# Patient Record
Sex: MALE | Race: WHITE | NOT HISPANIC OR LATINO | URBAN - METROPOLITAN AREA
[De-identification: names, ages, dates, MRNs, and addresses within clinical notes are randomized per-mention and may not be internally consistent; named-entity substitution may affect disease eponyms.]

---

## 2017-01-02 ENCOUNTER — EMERGENCY (EMERGENCY)
Facility: HOSPITAL | Age: 82
LOS: 1 days | Discharge: HOME | End: 2017-01-02
Attending: EMERGENCY MEDICINE | Admitting: EMERGENCY MEDICINE
Payer: MEDICARE

## 2017-01-02 VITALS
SYSTOLIC BLOOD PRESSURE: 135 MMHG | DIASTOLIC BLOOD PRESSURE: 71 MMHG | HEART RATE: 71 BPM | RESPIRATION RATE: 17 BRPM | TEMPERATURE: 98 F | OXYGEN SATURATION: 98 %

## 2017-01-02 VITALS
DIASTOLIC BLOOD PRESSURE: 70 MMHG | SYSTOLIC BLOOD PRESSURE: 163 MMHG | HEART RATE: 60 BPM | OXYGEN SATURATION: 97 % | TEMPERATURE: 98 F | RESPIRATION RATE: 18 BRPM | WEIGHT: 163.58 LBS

## 2017-01-02 DIAGNOSIS — E11.9 TYPE 2 DIABETES MELLITUS WITHOUT COMPLICATIONS: ICD-10-CM

## 2017-01-02 DIAGNOSIS — Z95.1 PRESENCE OF AORTOCORONARY BYPASS GRAFT: Chronic | ICD-10-CM

## 2017-01-02 DIAGNOSIS — K62.5 HEMORRHAGE OF ANUS AND RECTUM: ICD-10-CM

## 2017-01-02 DIAGNOSIS — Z79.899 OTHER LONG TERM (CURRENT) DRUG THERAPY: ICD-10-CM

## 2017-01-02 DIAGNOSIS — I10 ESSENTIAL (PRIMARY) HYPERTENSION: ICD-10-CM

## 2017-01-02 DIAGNOSIS — K64.4 RESIDUAL HEMORRHOIDAL SKIN TAGS: ICD-10-CM

## 2017-01-02 DIAGNOSIS — I25.10 ATHEROSCLEROTIC HEART DISEASE OF NATIVE CORONARY ARTERY WITHOUT ANGINA PECTORIS: ICD-10-CM

## 2017-01-02 LAB
ALBUMIN SERPL ELPH-MCNC: 4.1 G/DL — SIGNIFICANT CHANGE UP (ref 3.4–5)
ALP SERPL-CCNC: 66 U/L — SIGNIFICANT CHANGE UP (ref 40–120)
ALT FLD-CCNC: 18 U/L — SIGNIFICANT CHANGE UP (ref 12–42)
ANION GAP SERPL CALC-SCNC: 8 MMOL/L — LOW (ref 9–16)
APPEARANCE UR: CLEAR — SIGNIFICANT CHANGE UP
APTT BLD: 64.8 SEC — HIGH (ref 27.5–37.4)
AST SERPL-CCNC: 20 U/L — SIGNIFICANT CHANGE UP (ref 15–37)
BASOPHILS NFR BLD AUTO: 0.2 % — SIGNIFICANT CHANGE UP (ref 0–2)
BILIRUB SERPL-MCNC: 1.5 MG/DL — HIGH (ref 0.2–1.2)
BILIRUB UR-MCNC: NEGATIVE — SIGNIFICANT CHANGE UP
BLD GP AB SCN SERPL QL: NEGATIVE — SIGNIFICANT CHANGE UP
BUN SERPL-MCNC: 25 MG/DL — HIGH (ref 7–23)
CALCIUM SERPL-MCNC: 9.4 MG/DL — SIGNIFICANT CHANGE UP (ref 8.5–10.5)
CHLORIDE SERPL-SCNC: 104 MMOL/L — SIGNIFICANT CHANGE UP (ref 96–108)
CK MB CFR SERPL CALC: 3.6 NG/ML — SIGNIFICANT CHANGE UP (ref 0.5–3.6)
CK SERPL-CCNC: 151 U/L — SIGNIFICANT CHANGE UP (ref 39–308)
CO2 SERPL-SCNC: 26 MMOL/L — SIGNIFICANT CHANGE UP (ref 22–31)
COLOR SPEC: YELLOW — SIGNIFICANT CHANGE UP
COMMENT - URINE: SIGNIFICANT CHANGE UP
CREAT SERPL-MCNC: 1.09 MG/DL — SIGNIFICANT CHANGE UP (ref 0.5–1.3)
DIFF PNL FLD: (no result)
EOSINOPHIL NFR BLD AUTO: 0.3 % — SIGNIFICANT CHANGE UP (ref 0–6)
EPI CELLS # UR: SIGNIFICANT CHANGE UP /HPF
GLUCOSE SERPL-MCNC: 129 MG/DL — HIGH (ref 70–99)
GLUCOSE UR QL: NEGATIVE — SIGNIFICANT CHANGE UP
HCT VFR BLD CALC: 40.7 % — SIGNIFICANT CHANGE UP (ref 39–50)
HGB BLD-MCNC: 13.8 G/DL — SIGNIFICANT CHANGE UP (ref 13–17)
INR BLD: 1.65 — HIGH (ref 0.88–1.16)
KETONES UR-MCNC: NEGATIVE — SIGNIFICANT CHANGE UP
LEUKOCYTE ESTERASE UR-ACNC: NEGATIVE — SIGNIFICANT CHANGE UP
LYMPHOCYTES # BLD AUTO: 9.3 % — LOW (ref 13–44)
MCHC RBC-ENTMCNC: 30.4 PG — SIGNIFICANT CHANGE UP (ref 27–34)
MCHC RBC-ENTMCNC: 33.9 G/DL — SIGNIFICANT CHANGE UP (ref 32–36)
MCV RBC AUTO: 89.6 FL — SIGNIFICANT CHANGE UP (ref 80–100)
MONOCYTES NFR BLD AUTO: 7.7 % — SIGNIFICANT CHANGE UP (ref 2–14)
NEUTROPHILS NFR BLD AUTO: 82.5 % — HIGH (ref 43–77)
NITRITE UR-MCNC: NEGATIVE — SIGNIFICANT CHANGE UP
PH UR: 5.5 — SIGNIFICANT CHANGE UP (ref 4–8)
PLATELET # BLD AUTO: 199 K/UL — SIGNIFICANT CHANGE UP (ref 150–400)
POTASSIUM SERPL-MCNC: 4.1 MMOL/L — SIGNIFICANT CHANGE UP (ref 3.5–5.3)
POTASSIUM SERPL-SCNC: 4.1 MMOL/L — SIGNIFICANT CHANGE UP (ref 3.5–5.3)
PROT SERPL-MCNC: 7.8 G/DL — SIGNIFICANT CHANGE UP (ref 6.4–8.2)
PROT UR-MCNC: NEGATIVE MG/DL — SIGNIFICANT CHANGE UP
PROTHROM AB SERPL-ACNC: 18.4 SEC — HIGH (ref 10–13.1)
RBC # BLD: 4.54 M/UL — SIGNIFICANT CHANGE UP (ref 4.2–5.8)
RBC # FLD: 13.4 % — SIGNIFICANT CHANGE UP (ref 10.3–16.9)
RBC CASTS # UR COMP ASSIST: < 5 /HPF — SIGNIFICANT CHANGE UP
RH IG SCN BLD-IMP: POSITIVE — SIGNIFICANT CHANGE UP
SODIUM SERPL-SCNC: 138 MMOL/L — SIGNIFICANT CHANGE UP (ref 135–145)
SP GR SPEC: 1.02 — SIGNIFICANT CHANGE UP (ref 1–1.03)
TROPONIN I SERPL-MCNC: 0.03 NG/ML — SIGNIFICANT CHANGE UP (ref 0.01–0.04)
UROBILINOGEN FLD QL: 0.2 E.U./DL — SIGNIFICANT CHANGE UP
WBC # BLD: 10.2 K/UL — SIGNIFICANT CHANGE UP (ref 3.8–10.5)
WBC # FLD AUTO: 10.2 K/UL — SIGNIFICANT CHANGE UP (ref 3.8–10.5)
WBC UR QL: < 5 /HPF — SIGNIFICANT CHANGE UP

## 2017-01-02 PROCEDURE — 74176 CT ABD & PELVIS W/O CONTRAST: CPT

## 2017-01-02 PROCEDURE — 74176 CT ABD & PELVIS W/O CONTRAST: CPT | Mod: 26

## 2017-01-02 PROCEDURE — 85610 PROTHROMBIN TIME: CPT

## 2017-01-02 PROCEDURE — 84484 ASSAY OF TROPONIN QUANT: CPT

## 2017-01-02 PROCEDURE — 99284 EMERGENCY DEPT VISIT MOD MDM: CPT | Mod: 25

## 2017-01-02 PROCEDURE — 85730 THROMBOPLASTIN TIME PARTIAL: CPT

## 2017-01-02 PROCEDURE — 93005 ELECTROCARDIOGRAM TRACING: CPT

## 2017-01-02 PROCEDURE — 80053 COMPREHEN METABOLIC PANEL: CPT

## 2017-01-02 PROCEDURE — 81001 URINALYSIS AUTO W/SCOPE: CPT

## 2017-01-02 PROCEDURE — 82553 CREATINE MB FRACTION: CPT

## 2017-01-02 PROCEDURE — 82550 ASSAY OF CK (CPK): CPT

## 2017-01-02 PROCEDURE — 86901 BLOOD TYPING SEROLOGIC RH(D): CPT

## 2017-01-02 PROCEDURE — 81003 URINALYSIS AUTO W/O SCOPE: CPT

## 2017-01-02 PROCEDURE — 76376 3D RENDER W/INTRP POSTPROCES: CPT | Mod: 26

## 2017-01-02 PROCEDURE — 93010 ELECTROCARDIOGRAM REPORT: CPT

## 2017-01-02 PROCEDURE — 99285 EMERGENCY DEPT VISIT HI MDM: CPT | Mod: 25

## 2017-01-02 PROCEDURE — 86900 BLOOD TYPING SEROLOGIC ABO: CPT

## 2017-01-02 PROCEDURE — 85025 COMPLETE CBC W/AUTO DIFF WBC: CPT

## 2017-01-02 PROCEDURE — 86850 RBC ANTIBODY SCREEN: CPT

## 2017-01-02 RX ORDER — HYDROCORTISONE 1 %
1 OINTMENT (GRAM) TOPICAL
Qty: 1 | Refills: 0 | OUTPATIENT
Start: 2017-01-02 | End: 2017-01-16

## 2017-01-02 RX ORDER — DOCUSATE SODIUM 100 MG
1 CAPSULE ORAL
Qty: 14 | Refills: 0 | OUTPATIENT
Start: 2017-01-02 | End: 2017-01-09

## 2017-01-02 RX ORDER — IOHEXOL 300 MG/ML
50 INJECTION, SOLUTION INTRAVENOUS ONCE
Qty: 0 | Refills: 0 | Status: COMPLETED | OUTPATIENT
Start: 2017-01-02 | End: 2017-01-02

## 2017-01-02 RX ORDER — SODIUM CHLORIDE 9 MG/ML
1000 INJECTION INTRAMUSCULAR; INTRAVENOUS; SUBCUTANEOUS
Qty: 0 | Refills: 0 | Status: DISCONTINUED | OUTPATIENT
Start: 2017-01-02 | End: 2017-01-06

## 2017-01-02 RX ADMIN — IOHEXOL 50 MILLILITER(S): 300 INJECTION, SOLUTION INTRAVENOUS at 11:56

## 2017-01-02 RX ADMIN — SODIUM CHLORIDE 125 MILLILITER(S): 9 INJECTION INTRAMUSCULAR; INTRAVENOUS; SUBCUTANEOUS at 12:00

## 2017-01-02 NOTE — ED PROVIDER NOTE - ATTENDING CONTRIBUTION TO CARE
80 yo male h/o cad, chf, cva, dm, gi bleed 2/2 polyp removal c/o blood noted on post thighs and underwear w/o c/o rectal pain.  Pt had LLQ pain earlier this am - severe - now improved.  No h/o diverticulosis.  No dysria, hematuria, h/o renal stone.  No fever, n/v/d. No cp, palpitations, sob, dizziness, weakness.  BM w/o blood earlier today.  Well appearing, nad, nc/at, perrl, lung cta, heart reg,abd soft, nabs, mild ttp llq, no cvat, ext no c/c/e, no gross neuro deficits, rectal brown stool, no blood, + ext hemorrhoid w dried blood.  Suspect rectal bleed 2/2 hemorrhoid but ttp llq rather than diverticulosis w bleed, ? if pain 2/2 renal stone.  ua w trace blood, labs wnl, vss.  pt sent for ct to eval for stone vs tics - nl ct.  dc home to f/u pmd, gi for hemorrhoid, rectal bleeding.

## 2017-01-02 NOTE — ED ADULT NURSE NOTE - OBJECTIVE STATEMENT
Patient arrived to ED via walk-in, Mexican speaking, per daughter, "I noticed blood on the floor and on the toilet seat in the bathroom.  I think it started yesterday."  Patient is A&Ox2 (person, place) at baseline from prior CVA, complaining of BRB per rectum, weakness, dizziness, SOB and 5/10 epigastric pressure.  Patient denies nausea, vomiting, diarrhea, fever or chills.  On Pradaxa 150mg x twice per day.  PMH of CABG, Pacemaker, DM, HTN and CVA.  PIV placed, labs drawn and sent, EKG done.  Will continue with plan of care.

## 2017-01-02 NOTE — ED PROVIDER NOTE - PMH
CAD (coronary artery disease)    CHF (congestive heart failure)    CVA (cerebral vascular accident)    DM (diabetes mellitus)

## 2017-01-02 NOTE — ED ADULT NURSE REASSESSMENT NOTE - NS ED NURSE REASSESS COMMENT FT1
Patient resting comfortably in ED Bed 20, in no visible acute distress, vitals stable, dispo pending CT results.  Will continue with plan of care.

## 2017-01-02 NOTE — ED PROVIDER NOTE - OBJECTIVE STATEMENT
80 y/o m with h/o CHF, CAD, CVA, dementia, DM, HTN, rectal bleed post colonoscopy, CABG on pradaxa x over one year c/o rectal bleeding two noted episodes yesterday. As per daughter who is his caretaker noted dry blood on his leg coming from his buttocks. She states of dad has been constipated and she gave him a laxative but very little BM. Admit of dad complaining of abdominal pain. Denies fever, sob, chest pain, dysuria, black or bloody stool unknown due to dad's dementia.

## 2017-01-02 NOTE — ED ADULT TRIAGE NOTE - CHIEF COMPLAINT QUOTE
Pt's daughter states "he is on Pradaxa and he is bleeding from his rectum. I noticed bleeding yesterday and he was bleeding this morning." Pt C/O generalized abdominal pain, No N+V. Pt has hx of CVA, HTN, CHF, DM, CABG, pacemaker, GI bleed with transfusion.

## 2017-01-02 NOTE — ED PROVIDER NOTE - MEDICAL DECISION MAKING DETAILS
Patient with rectal bleeding secondary to hemorrhoids , hemodynamically stable and afebrile. Well appearing, NAD and VSS. Labs and ct scan wnl. Recommend stool softener/ anusol supp, sitz bath , high fiber diet  and f/u with GI.

## 2017-01-05 ENCOUNTER — MESSAGE (OUTPATIENT)
Age: 82
End: 2017-01-05

## 2017-03-20 PROBLEM — E11.9 TYPE 2 DIABETES MELLITUS WITHOUT COMPLICATIONS: Chronic | Status: ACTIVE | Noted: 2017-01-02

## 2017-03-21 ENCOUNTER — APPOINTMENT (OUTPATIENT)
Dept: HEART AND VASCULAR | Facility: CLINIC | Age: 82
End: 2017-03-21

## 2017-03-21 VITALS
HEIGHT: 68 IN | OXYGEN SATURATION: 95 % | HEART RATE: 60 BPM | SYSTOLIC BLOOD PRESSURE: 130 MMHG | TEMPERATURE: 98 F | DIASTOLIC BLOOD PRESSURE: 70 MMHG

## 2017-03-21 DIAGNOSIS — I50.40 UNSPECIFIED COMBINED SYSTOLIC (CONGESTIVE) AND DIASTOLIC (CONGESTIVE) HEART FAILURE: ICD-10-CM

## 2017-07-10 ENCOUNTER — RX RENEWAL (OUTPATIENT)
Age: 82
End: 2017-07-10

## 2017-07-13 ENCOUNTER — RX RENEWAL (OUTPATIENT)
Age: 82
End: 2017-07-13

## 2017-07-20 ENCOUNTER — APPOINTMENT (OUTPATIENT)
Dept: HEART AND VASCULAR | Facility: CLINIC | Age: 82
End: 2017-07-20

## 2017-07-20 VITALS — DIASTOLIC BLOOD PRESSURE: 59 MMHG | SYSTOLIC BLOOD PRESSURE: 121 MMHG | HEART RATE: 60 BPM

## 2017-07-20 DIAGNOSIS — Z86.79 PERSONAL HISTORY OF OTHER DISEASES OF THE CIRCULATORY SYSTEM: ICD-10-CM

## 2017-07-20 RX ORDER — APIXABAN 5 MG/1
5 TABLET, FILM COATED ORAL
Refills: 0 | Status: ACTIVE | COMMUNITY
Start: 2017-07-20

## 2017-08-11 ENCOUNTER — INPATIENT (INPATIENT)
Facility: HOSPITAL | Age: 82
LOS: 0 days | Discharge: HOME | DRG: 227 | End: 2017-08-12
Attending: INTERNAL MEDICINE | Admitting: INTERNAL MEDICINE
Payer: MEDICARE

## 2017-08-11 ENCOUNTER — TRANSCRIPTION ENCOUNTER (OUTPATIENT)
Age: 82
End: 2017-08-11

## 2017-08-11 VITALS
RESPIRATION RATE: 16 BRPM | HEIGHT: 68 IN | OXYGEN SATURATION: 96 % | WEIGHT: 167.99 LBS | DIASTOLIC BLOOD PRESSURE: 69 MMHG | TEMPERATURE: 98 F | HEART RATE: 60 BPM | SYSTOLIC BLOOD PRESSURE: 138 MMHG

## 2017-08-11 DIAGNOSIS — Z95.0 PRESENCE OF CARDIAC PACEMAKER: Chronic | ICD-10-CM

## 2017-08-11 DIAGNOSIS — E11.9 TYPE 2 DIABETES MELLITUS WITHOUT COMPLICATIONS: ICD-10-CM

## 2017-08-11 DIAGNOSIS — Z95.1 PRESENCE OF AORTOCORONARY BYPASS GRAFT: Chronic | ICD-10-CM

## 2017-08-11 DIAGNOSIS — Z95.810 PRESENCE OF AUTOMATIC (IMPLANTABLE) CARDIAC DEFIBRILLATOR: ICD-10-CM

## 2017-08-11 DIAGNOSIS — I50.42 CHRONIC COMBINED SYSTOLIC (CONGESTIVE) AND DIASTOLIC (CONGESTIVE) HEART FAILURE: ICD-10-CM

## 2017-08-11 DIAGNOSIS — I48.2 CHRONIC ATRIAL FIBRILLATION: ICD-10-CM

## 2017-08-11 PROCEDURE — 33234 REMOVAL OF PACEMAKER SYSTEM: CPT

## 2017-08-11 PROCEDURE — 33249 INSJ/RPLCMT DEFIB W/LEAD(S): CPT | Mod: Q0

## 2017-08-11 PROCEDURE — 93306 TTE W/DOPPLER COMPLETE: CPT | Mod: 26

## 2017-08-11 PROCEDURE — 93641 EP EVL 1/2CHMB PAC CVDFB TST: CPT | Mod: 26

## 2017-08-11 PROCEDURE — 93010 ELECTROCARDIOGRAM REPORT: CPT

## 2017-08-11 PROCEDURE — 33233 REMOVAL OF PM GENERATOR: CPT

## 2017-08-11 PROCEDURE — 33225 L VENTRIC PACING LEAD ADD-ON: CPT

## 2017-08-11 RX ORDER — ACETAMINOPHEN 500 MG
650 TABLET ORAL EVERY 6 HOURS
Qty: 0 | Refills: 0 | Status: DISCONTINUED | OUTPATIENT
Start: 2017-08-11 | End: 2017-08-12

## 2017-08-11 RX ORDER — GLUCAGON INJECTION, SOLUTION 0.5 MG/.1ML
1 INJECTION, SOLUTION SUBCUTANEOUS ONCE
Qty: 0 | Refills: 0 | Status: DISCONTINUED | OUTPATIENT
Start: 2017-08-11 | End: 2017-08-12

## 2017-08-11 RX ORDER — DIGOXIN 250 MCG
0.12 TABLET ORAL DAILY
Qty: 0 | Refills: 0 | Status: DISCONTINUED | OUTPATIENT
Start: 2017-08-11 | End: 2017-08-12

## 2017-08-11 RX ORDER — SODIUM CHLORIDE 9 MG/ML
1000 INJECTION, SOLUTION INTRAVENOUS
Qty: 0 | Refills: 0 | Status: DISCONTINUED | OUTPATIENT
Start: 2017-08-11 | End: 2017-08-12

## 2017-08-11 RX ORDER — METOPROLOL TARTRATE 50 MG
50 TABLET ORAL DAILY
Qty: 0 | Refills: 0 | Status: DISCONTINUED | OUTPATIENT
Start: 2017-08-11 | End: 2017-08-12

## 2017-08-11 RX ORDER — FUROSEMIDE 40 MG
40 TABLET ORAL DAILY
Qty: 0 | Refills: 0 | Status: DISCONTINUED | OUTPATIENT
Start: 2017-08-11 | End: 2017-08-12

## 2017-08-11 RX ORDER — VALSARTAN 80 MG/1
40 TABLET ORAL DAILY
Qty: 0 | Refills: 0 | Status: DISCONTINUED | OUTPATIENT
Start: 2017-08-11 | End: 2017-08-12

## 2017-08-11 RX ORDER — DIGOXIN 250 MCG
125 TABLET ORAL DAILY
Qty: 0 | Refills: 0 | Status: DISCONTINUED | OUTPATIENT
Start: 2017-08-11 | End: 2017-08-11

## 2017-08-11 RX ORDER — VANCOMYCIN HCL 1 G
1000 VIAL (EA) INTRAVENOUS ONCE
Qty: 0 | Refills: 0 | Status: COMPLETED | OUTPATIENT
Start: 2017-08-11 | End: 2017-08-11

## 2017-08-11 RX ORDER — DABIGATRAN ETEXILATE MESYLATE 150 MG/1
1 CAPSULE ORAL
Qty: 0 | Refills: 0 | COMMUNITY

## 2017-08-11 RX ORDER — FUROSEMIDE 40 MG
0 TABLET ORAL
Qty: 0 | Refills: 0 | COMMUNITY

## 2017-08-11 RX ORDER — DEXTROSE 50 % IN WATER 50 %
1 SYRINGE (ML) INTRAVENOUS ONCE
Qty: 0 | Refills: 0 | Status: DISCONTINUED | OUTPATIENT
Start: 2017-08-11 | End: 2017-08-12

## 2017-08-11 RX ORDER — DEXTROSE 50 % IN WATER 50 %
12.5 SYRINGE (ML) INTRAVENOUS ONCE
Qty: 0 | Refills: 0 | Status: DISCONTINUED | OUTPATIENT
Start: 2017-08-11 | End: 2017-08-12

## 2017-08-11 RX ORDER — METOPROLOL TARTRATE 50 MG
0 TABLET ORAL
Qty: 0 | Refills: 0 | COMMUNITY

## 2017-08-11 RX ORDER — INSULIN LISPRO 100/ML
VIAL (ML) SUBCUTANEOUS ONCE
Qty: 0 | Refills: 0 | Status: COMPLETED | OUTPATIENT
Start: 2017-08-11 | End: 2017-08-11

## 2017-08-11 RX ORDER — POTASSIUM CHLORIDE 20 MEQ
10 PACKET (EA) ORAL DAILY
Qty: 0 | Refills: 0 | Status: DISCONTINUED | OUTPATIENT
Start: 2017-08-11 | End: 2017-08-12

## 2017-08-11 RX ADMIN — VALSARTAN 40 MILLIGRAM(S): 80 TABLET ORAL at 21:34

## 2017-08-11 RX ADMIN — Medication 250 MILLIGRAM(S): at 10:00

## 2017-08-11 RX ADMIN — Medication 10 MILLIEQUIVALENT(S): at 18:45

## 2017-08-11 RX ADMIN — Medication 0.12 MILLIGRAM(S): at 17:43

## 2017-08-11 RX ADMIN — Medication 50 MILLIGRAM(S): at 21:34

## 2017-08-11 RX ADMIN — Medication 250 MILLIGRAM(S): at 21:34

## 2017-08-11 RX ADMIN — Medication 650 MILLIGRAM(S): at 19:42

## 2017-08-11 RX ADMIN — Medication 650 MILLIGRAM(S): at 18:46

## 2017-08-11 RX ADMIN — Medication 40 MILLIGRAM(S): at 17:43

## 2017-08-11 NOTE — H&P ADULT - PROBLEM SELECTOR PLAN 3
- [id number] 7, on Eliquis 5 mg BID. On hold for now. Will reevaluate the wound and either restart it tomorrow morning or tomorrow evening. - Pt currently on Metformin.   - Started patient on low dose corrective insulin while in hospital with finger sticks before meals and before bed.

## 2017-08-11 NOTE — H&P ADULT - PROBLEM SELECTOR PLAN 2
- Hx of permanent Afib with slow VR, s/p single chamber PPM.  - [id number] 7, on Eliquis 5 mg BID. Last dose was yesterday morning. Will determine when to restart after the procedure.  - Continue digoxin

## 2017-08-11 NOTE — H&P ADULT - PROBLEM SELECTOR PROBLEM 3
Atrial fibrillation, permanent Type 2 diabetes mellitus without complication, without long-term current use of insulin

## 2017-08-11 NOTE — DISCHARGE NOTE ADULT - CARE PROVIDER_API CALL
Paulo Garza), Cardiac Electrophysiology; Cardiovascular Disease; Internal Medicine  [address] [place] [address]  Phone: [phone]  Fax: [phone]

## 2017-08-11 NOTE — H&P ADULT - NSHPPHYSICALEXAM_GEN_ALL_CORE
Constitutional: Normal appearance, well groomed, well nourished, NAD  Neuro: A+Ox3  Cards: RRR S1, S2, no r/g/m, 1+ pitting edema to LE b/l  Pulm: Equal air entry b/l, no rales, no wheezes  GI: Abdomen soft, non-distended, no masses, BS present x 4  Extremities: Mild cyanosis to nail beds

## 2017-08-11 NOTE — H&P ADULT - PMH
CAD (coronary artery disease)    CHF (congestive heart failure)    CVA (cerebral vascular accident)    DM (diabetes mellitus) Atrial fibrillation, permanent  with slow ventricular response  CAD (coronary artery disease)    CHF (congestive heart failure)    CVA (cerebral vascular accident)    DM (diabetes mellitus)    LBBB (left bundle branch block)

## 2017-08-11 NOTE — H&P ADULT - ASSESSMENT
Mr. Meade is an 81y.o. male with a pmh of permanent Afib with slow VR s/p single chamber PPM, CVA, HTN, CAD s/p CABG, ICM, Combined Systolic/Diastolic HF (EF 15% on 7/7/17), LBBB, and DMII who presents for a device upgrade to a BiV ICD.

## 2017-08-11 NOTE — H&P ADULT - HISTORY OF PRESENT ILLNESS
Mr. Meade is an 81y.o. male with a pmh of permanent Afib with slow VR s/p single chamber PPM, CVA, HTN, CAD s/p CABG, ICM, Combined Systolic/Diastolic HF (EF 15% on 7/7/17), LBBB, and DMII who presents for a device upgrade to a BiV ICD. According to the patient's daughter, the patient has been increasingly dyspneic on exertion. The patient is able to walk around the block but becomes very winded. He is completely intolerant to climbing stairs or walking on any incline (NYHA Class III). She also states that has some LE edema that is stable and well-managed with lasix. He denies c/p, palpitations, diaphoresis, dizziness, and syncope, however she states that he will often not communicate his discomfort. She also states that he has cognitive deficits which may affect his reporting. The patient was referred by his cardiologist, Dr. Alvarez, for evaluation for BiV ICD upgrade do to a low EF and a wide QRS (the patient has a LBBB). An echo was performed on 7/7/17 which showed an EF of 15%. At present, the patient denies sob, c/p, dizziness and palpitations.

## 2017-08-11 NOTE — DISCHARGE NOTE ADULT - CARE PLAN
Principal Discharge DX:	S/P ICD (internal cardiac defibrillator) procedure  Goal:	To improve your heart function.  Instructions for follow-up, activity and diet:	Your ICD was replaced with a model that should help to improve your heart function. Please continue to take your Metoprolol and Valsartan as prescribed to help your heart function improve, and follow up with Dr. Garza to ensure your device is working correctly.  Secondary Diagnosis:	Atrial fibrillation, permanent  Instructions for follow-up, activity and diet:	You were previously diagnosed with atrial fibrillation. This is an abnormal heart beat that, if left uncontrolled, can cause symptoms like chest pain and shortness of breath. Please continue to take your Metoprolol, Digoxin and Eliquis as prescribed and follow up with your PMD for further management of your atrial fibrillation.  Secondary Diagnosis:	LBBB (left bundle branch block)  Instructions for follow-up, activity and diet:	You were previously diagnosed with a Left bundle branch block. This can prevent your heart from functioning correctly. The device you had installed will help correct this problem and increase your heart function.  Secondary Diagnosis:	Chronic combined systolic and diastolic congestive heart failure  Instructions for follow-up, activity and diet:	You were previously diagnosed with congestive heart failure. The device you had installed will help to improve your heart function and minimize your symptoms. Please continue your Metoprolol, Valsartan and Lasix as prescribed, and follow up with your PMD and Cardiologist to help manage your symptoms. If you have symptoms of worsening heart failure, such as shortness of breath, distended neck veins and leg swelling, please contact your doctor immediately.  Secondary Diagnosis:	CAD (coronary artery disease)  Instructions for follow-up, activity and diet:	You were previously diagnosed with coronary artery disease. Please continue to take your Metoprolol, Eliquis, and Valsartan to help prevent worsening of your coronary artery disease. Please follow up with your primary care doctor to monitor your heart.  Secondary Diagnosis:	Type 2 diabetes mellitus without complication, without long-term current use of insulin  Instructions for follow-up, activity and diet:	Please continue to take your Metformin as prescribed to help control your blood sugar levels, and follow up with your PMD for monitoring of your blood sugar.  Secondary Diagnosis:	CVA (cerebral vascular accident)  Instructions for follow-up, activity and diet:	You were previously diagnosed with a cerebral vascular accident. Please continue to take your Eliquis as prescribed to help prevent further episodes. Principal Discharge DX:	S/P ICD (internal cardiac defibrillator) procedure  Goal:	To improve your heart function.  Instructions for follow-up, activity and diet:	Your ICD was replaced with a model that should help to improve your heart function. Please continue to take your Digoxin and Valsartan as prescribed to help your heart function improve, and follow up with Dr. Garza to ensure your device is working correctly.  Secondary Diagnosis:	Atrial fibrillation, permanent  Instructions for follow-up, activity and diet:	You were previously diagnosed with atrial fibrillation. This is an abnormal heart beat that, if left uncontrolled, can cause symptoms like chest pain and shortness of breath. Please continue to take your Digoxin and Eliquis as prescribed and follow up with your PMD for further management of your atrial fibrillation.  Secondary Diagnosis:	LBBB (left bundle branch block)  Instructions for follow-up, activity and diet:	You were previously diagnosed with a Left bundle branch block. This can prevent your heart from functioning correctly. The device you had installed will help correct this problem and increase your heart function.  Secondary Diagnosis:	Chronic combined systolic and diastolic congestive heart failure  Instructions for follow-up, activity and diet:	You were previously diagnosed with congestive heart failure. The device you had installed will help to improve your heart function and minimize your symptoms. Please continue your Valsartan and Lasix as prescribed, and follow up with your PMD and Cardiologist to help manage your symptoms. If you have symptoms of worsening heart failure, such as shortness of breath, distended neck veins and leg swelling, please contact your doctor immediately. You were given metoprolol in the hospital to help control your heart rate. Please follow up with your cardiologist with regards to continuing metoprolol use.  Secondary Diagnosis:	CAD (coronary artery disease)  Instructions for follow-up, activity and diet:	You were previously diagnosed with coronary artery disease. Please continue to take your Eliquis, and Valsartan to help prevent worsening of your coronary artery disease. Please follow up with your primary care doctor to monitor your heart.  Secondary Diagnosis:	Type 2 diabetes mellitus without complication, without long-term current use of insulin  Instructions for follow-up, activity and diet:	Please continue to take your Metformin as prescribed to help control your blood sugar levels, and follow up with your PMD for monitoring of your blood sugar.  Secondary Diagnosis:	CVA (cerebral vascular accident)  Instructions for follow-up, activity and diet:	You were previously diagnosed with a cerebral vascular accident. Please continue to take your Eliquis as prescribed to help prevent further episodes. Principal Discharge DX:	S/P ICD (internal cardiac defibrillator) procedure  Goal:	To improve your heart function.  Instructions for follow-up, activity and diet:	Your ICD was replaced with a model that should help to improve your heart function. Please continue to take your Digoxin and Valsartan as prescribed to help your heart function improve, and follow up with Dr. Garza to ensure your device is working correctly.  Secondary Diagnosis:	Atrial fibrillation, permanent  Instructions for follow-up, activity and diet:	You were previously diagnosed with atrial fibrillation. This is an abnormal heart beat that, if left uncontrolled, can cause symptoms like chest pain and shortness of breath. Please continue to take your Metoprolol, Digoxin and Eliquis as prescribed and follow up with your PMD for further management of your atrial fibrillation.  Secondary Diagnosis:	LBBB (left bundle branch block)  Instructions for follow-up, activity and diet:	You were previously diagnosed with a Left bundle branch block. This can prevent your heart from functioning correctly. The device you had installed will help correct this problem and increase your heart function.  Secondary Diagnosis:	Chronic combined systolic and diastolic congestive heart failure  Instructions for follow-up, activity and diet:	You were previously diagnosed with congestive heart failure. The device you had installed will help to improve your heart function and minimize your symptoms. Please continue your Metoprolol, Valsartan and Lasix as prescribed, and follow up with your PMD and Cardiologist to help manage your symptoms. If you have symptoms of worsening heart failure, such as shortness of breath, distended neck veins and leg swelling, please contact your doctor immediately. You were given metoprolol in the hospital to help control your heart rate.  Secondary Diagnosis:	CAD (coronary artery disease)  Instructions for follow-up, activity and diet:	You were previously diagnosed with coronary artery disease. Please continue to take your Eliquis, and Valsartan to help prevent worsening of your coronary artery disease. Please follow up with your primary care doctor to monitor your heart.  Secondary Diagnosis:	Type 2 diabetes mellitus without complication, without long-term current use of insulin  Instructions for follow-up, activity and diet:	Please continue to take your Metformin as prescribed to help control your blood sugar levels, and follow up with your PMD for monitoring of your blood sugar.  Secondary Diagnosis:	CVA (cerebral vascular accident)  Instructions for follow-up, activity and diet:	You were previously diagnosed with a cerebral vascular accident. Please continue to take your Eliquis as prescribed to help prevent further episodes.

## 2017-08-11 NOTE — H&P ADULT - PROBLEM SELECTOR PLAN 1
- - EF from 7/7/17 was 15% and the patient is NYHA Class III HF, combined with a LBBB, the patient qualifies for a BiV ICD.   - Continue Valsartan and lasix

## 2017-08-11 NOTE — DISCHARGE NOTE ADULT - MEDICATION SUMMARY - MEDICATIONS TO STOP TAKING
I will STOP taking the medications listed below when I get home from the hospital:    metoprolol  --  by mouth I will STOP taking the medications listed below when I get home from the hospital:  None

## 2017-08-11 NOTE — DISCHARGE NOTE ADULT - MEDICATION SUMMARY - MEDICATIONS TO TAKE
I will START or STAY ON the medications listed below when I get home from the hospital:    valsartan 40 mg oral tablet  -- 40 milligram(s) by mouth once a day  -- Indication: For Chronic combined systolic and diastolic congestive heart failure    digoxin  --  by mouth   -- Indication: For Atrial fibrillation, permanent    Eliquis 5 mg oral tablet  -- 1 tab(s) by mouth 2 times a day  -- Indication: For Atrial fibrillation, permanent    metFORMIN  --  by mouth   -- Indication: For Type 2 diabetes mellitus without complication, without long-term current use of insulin    furosemide  -- 40  by mouth   -- Indication: For Chronic combined systolic and diastolic congestive heart failure    K-Dur 10  -- 10 milliequivalent(s) by mouth once a day  -- Indication: For Chronic combined systolic and diastolic congestive heart failure I will START or STAY ON the medications listed below when I get home from the hospital:    valsartan 40 mg oral tablet  -- 40 milligram(s) by mouth once a day  -- Indication: For Chronic combined systolic and diastolic congestive heart failure    digoxin  --  by mouth   -- Indication: For Atrial fibrillation, permanent    Eliquis 5 mg oral tablet  -- 1 tab(s) by mouth 2 times a day  -- Indication: For Atrial fibrillation, permanent    metFORMIN  --  by mouth   -- Indication: For Type 2 diabetes mellitus without complication, without long-term current use of insulin    metoprolol succinate 50 mg oral tablet, extended release  -- 1 tab(s) by mouth once a day  -- Indication: For Atrial fibrillation, permanent    furosemide  -- 40  by mouth   -- Indication: For Chronic combined systolic and diastolic congestive heart failure    K-Dur 10  -- 10 milliequivalent(s) by mouth once a day  -- Indication: For Chronic combined systolic and diastolic congestive heart failure

## 2017-08-11 NOTE — PROGRESS NOTE ADULT - SUBJECTIVE AND OBJECTIVE BOX
Brief procedure note, full note to follow.    Uncomplicated upgrade of single chamber pacemaker (permanent atrial fibrillation) to biventricular ICD.    Old pacemaker removed. Old passive fixation right ventricular lead extracted via manual traction with stylet.    New biventricular system implanted via two left axillary punctures (second rib technique, under fluoroscopic guidance).    Excellent pacing and sensing.    No complications.    Plan: vancomycin 12 hours from first dose to complete prophylaxis.  chest x-ray and ECG per routine  echocardiogram today.    To restart eliquis tomorrow. If pocket without hematoma, can give dose in am and observe for two hours.  Otherwise to start tomorrow evening.

## 2017-08-11 NOTE — H&P ADULT - PROBLEM SELECTOR PROBLEM 1
S/P ICD (internal cardiac defibrillator) procedure Chronic combined systolic and diastolic congestive heart failure

## 2017-08-11 NOTE — DISCHARGE NOTE ADULT - SECONDARY DIAGNOSIS.
Atrial fibrillation, permanent LBBB (left bundle branch block) Chronic combined systolic and diastolic congestive heart failure CAD (coronary artery disease) Type 2 diabetes mellitus without complication, without long-term current use of insulin CVA (cerebral vascular accident)

## 2017-08-11 NOTE — DISCHARGE NOTE ADULT - PATIENT PORTAL LINK FT
“You can access the [facility] Patient Portal, offered by [facility], by registering with the following website: [email or web address]”

## 2017-08-11 NOTE — DISCHARGE NOTE ADULT - PLAN OF CARE
You were previously diagnosed with atrial fibrillation. This is an abnormal heart beat that, if left uncontrolled, can cause symptoms like chest pain and shortness of breath. Please continue to take your Metoprolol, Digoxin and Eliquis as prescribed and follow up with your PMD for further management of your atrial fibrillation. To improve your heart function. Your ICD was replaced with a model that should help to improve your heart function. Please continue to take your Metoprolol and Valsartan as prescribed to help your heart function improve, and follow up with Dr. Garza to ensure your device is working correctly. You were previously diagnosed with a Left bundle branch block. This can prevent your heart from functioning correctly. The device you had installed will help correct this problem and increase your heart function. You were previously diagnosed with congestive heart failure. The device you had installed will help to improve your heart function and minimize your symptoms. Please continue your Metoprolol, Valsartan and Lasix as prescribed, and follow up with your PMD and Cardiologist to help manage your symptoms. If you have symptoms of worsening heart failure, such as shortness of breath, distended neck veins and leg swelling, please contact your doctor immediately. You were previously diagnosed with coronary artery disease. Please continue to take your Metoprolol, Eliquis, and Valsartan to help prevent worsening of your coronary artery disease. Please follow up with your primary care doctor to monitor your heart. Please continue to take your Metformin as prescribed to help control your blood sugar levels, and follow up with your PMD for monitoring of your blood sugar. You were previously diagnosed with a cerebral vascular accident. Please continue to take your Eliquis as prescribed to help prevent further episodes. You were previously diagnosed with atrial fibrillation. This is an abnormal heart beat that, if left uncontrolled, can cause symptoms like chest pain and shortness of breath. Please continue to take your Digoxin and Eliquis as prescribed and follow up with your PMD for further management of your atrial fibrillation. Your ICD was replaced with a model that should help to improve your heart function. Please continue to take your Digoxin and Valsartan as prescribed to help your heart function improve, and follow up with Dr. Garza to ensure your device is working correctly. You were previously diagnosed with congestive heart failure. The device you had installed will help to improve your heart function and minimize your symptoms. Please continue your Valsartan and Lasix as prescribed, and follow up with your PMD and Cardiologist to help manage your symptoms. If you have symptoms of worsening heart failure, such as shortness of breath, distended neck veins and leg swelling, please contact your doctor immediately. You were given metoprolol in the hospital to help control your heart rate. Please follow up with your cardiologist with regards to continuing metoprolol use. You were previously diagnosed with coronary artery disease. Please continue to take your Eliquis, and Valsartan to help prevent worsening of your coronary artery disease. Please follow up with your primary care doctor to monitor your heart. You were previously diagnosed with congestive heart failure. The device you had installed will help to improve your heart function and minimize your symptoms. Please continue your Metoprolol, Valsartan and Lasix as prescribed, and follow up with your PMD and Cardiologist to help manage your symptoms. If you have symptoms of worsening heart failure, such as shortness of breath, distended neck veins and leg swelling, please contact your doctor immediately. You were given metoprolol in the hospital to help control your heart rate.

## 2017-08-11 NOTE — H&P ADULT - PSH
S/P CABG (coronary artery bypass graft) S/P CABG (coronary artery bypass graft)    S/P placement of cardiac pacemaker

## 2017-08-12 VITALS — TEMPERATURE: 98 F

## 2017-08-12 PROCEDURE — 93284 PRGRMG EVAL IMPLANTABLE DFB: CPT | Mod: 26

## 2017-08-12 PROCEDURE — 71020: CPT | Mod: 26

## 2017-08-12 RX ORDER — METOPROLOL TARTRATE 50 MG
1 TABLET ORAL
Qty: 0 | Refills: 0 | COMMUNITY
Start: 2017-08-12

## 2017-08-12 RX ADMIN — Medication 650 MILLIGRAM(S): at 03:30

## 2017-08-12 RX ADMIN — Medication 650 MILLIGRAM(S): at 01:30

## 2017-08-12 RX ADMIN — Medication 0.12 MILLIGRAM(S): at 07:26

## 2017-08-12 NOTE — PROGRESS NOTE ADULT - SUBJECTIVE AND OBJECTIVE BOX
Electrophysiology Device Interrogation       Indication: s/p BiV ICD insertion    Device model: 	LUPE		                                Implanting Physician: MD Greg    Functioning Mode: DDD 50 - 130		    Underlying Rhythm:  AF    Pacemaker dependency: No    INTERROGATION    Battery status: Good      				RV		LV	  Sense (mV):                             11.5                   	 	  Threshold:                                 0.5V/0.4ms      0.75V/0.4ms                                                                            Pacing Impedance(ohms):          399                 342                                                                  Shock Impedance (ohms):                                                                                                       Events/Alert: none     Parameter changes: none	     Assessment: Normal device interrogation.  CXR Reviewed.  Leads in good postion.  Ready for discharge.  F/U reviewed in detail.

## 2017-08-15 DIAGNOSIS — Z88.0 ALLERGY STATUS TO PENICILLIN: ICD-10-CM

## 2017-08-15 DIAGNOSIS — I35.0 NONRHEUMATIC AORTIC (VALVE) STENOSIS: ICD-10-CM

## 2017-08-15 DIAGNOSIS — Z79.01 LONG TERM (CURRENT) USE OF ANTICOAGULANTS: ICD-10-CM

## 2017-08-15 DIAGNOSIS — I25.10 ATHEROSCLEROTIC HEART DISEASE OF NATIVE CORONARY ARTERY WITHOUT ANGINA PECTORIS: ICD-10-CM

## 2017-08-15 DIAGNOSIS — I11.0 HYPERTENSIVE HEART DISEASE WITH HEART FAILURE: ICD-10-CM

## 2017-08-15 DIAGNOSIS — I34.0 NONRHEUMATIC MITRAL (VALVE) INSUFFICIENCY: ICD-10-CM

## 2017-08-15 DIAGNOSIS — I69.928 OTHER SPEECH AND LANGUAGE DEFICITS FOLLOWING UNSPECIFIED CEREBROVASCULAR DISEASE: ICD-10-CM

## 2017-08-15 DIAGNOSIS — Z79.84 LONG TERM (CURRENT) USE OF ORAL HYPOGLYCEMIC DRUGS: ICD-10-CM

## 2017-08-15 DIAGNOSIS — E78.5 HYPERLIPIDEMIA, UNSPECIFIED: ICD-10-CM

## 2017-08-15 DIAGNOSIS — I50.42 CHRONIC COMBINED SYSTOLIC (CONGESTIVE) AND DIASTOLIC (CONGESTIVE) HEART FAILURE: ICD-10-CM

## 2017-08-15 DIAGNOSIS — E11.9 TYPE 2 DIABETES MELLITUS WITHOUT COMPLICATIONS: ICD-10-CM

## 2017-08-15 DIAGNOSIS — I42.8 OTHER CARDIOMYOPATHIES: ICD-10-CM

## 2017-08-15 DIAGNOSIS — I48.91 UNSPECIFIED ATRIAL FIBRILLATION: ICD-10-CM

## 2017-08-15 DIAGNOSIS — Z95.1 PRESENCE OF AORTOCORONARY BYPASS GRAFT: ICD-10-CM

## 2017-08-15 DIAGNOSIS — I48.2 CHRONIC ATRIAL FIBRILLATION: ICD-10-CM

## 2017-08-15 DIAGNOSIS — I25.5 ISCHEMIC CARDIOMYOPATHY: ICD-10-CM

## 2017-08-15 DIAGNOSIS — I44.7 LEFT BUNDLE-BRANCH BLOCK, UNSPECIFIED: ICD-10-CM

## 2017-08-22 PROCEDURE — 93005 ELECTROCARDIOGRAM TRACING: CPT

## 2017-08-22 PROCEDURE — C1882: CPT

## 2017-08-22 PROCEDURE — 71046 X-RAY EXAM CHEST 2 VIEWS: CPT

## 2017-08-22 PROCEDURE — C1777: CPT

## 2017-08-22 PROCEDURE — C1877: CPT

## 2017-08-22 PROCEDURE — C1900: CPT

## 2017-08-22 PROCEDURE — C1887: CPT

## 2017-08-22 PROCEDURE — 93306 TTE W/DOPPLER COMPLETE: CPT

## 2017-08-22 PROCEDURE — C1730: CPT

## 2017-08-22 PROCEDURE — C1894: CPT

## 2017-08-30 ENCOUNTER — APPOINTMENT (OUTPATIENT)
Dept: HEART AND VASCULAR | Facility: CLINIC | Age: 82
End: 2017-08-30

## 2017-09-28 ENCOUNTER — APPOINTMENT (OUTPATIENT)
Dept: HEART AND VASCULAR | Facility: CLINIC | Age: 82
End: 2017-09-28
Payer: MEDICARE

## 2017-09-28 VITALS
DIASTOLIC BLOOD PRESSURE: 97 MMHG | WEIGHT: 167 LBS | BODY MASS INDEX: 25.31 KG/M2 | SYSTOLIC BLOOD PRESSURE: 133 MMHG | HEART RATE: 64 BPM | HEIGHT: 68 IN

## 2017-09-28 PROCEDURE — 93284 PRGRMG EVAL IMPLANTABLE DFB: CPT

## 2017-11-20 ENCOUNTER — RX RENEWAL (OUTPATIENT)
Age: 82
End: 2017-11-20

## 2018-01-07 ENCOUNTER — RX RENEWAL (OUTPATIENT)
Age: 83
End: 2018-01-07

## 2018-01-10 RX ORDER — METOPROLOL SUCCINATE 50 MG/1
50 TABLET, EXTENDED RELEASE ORAL
Qty: 90 | Refills: 0 | Status: ACTIVE | COMMUNITY
Start: 2016-10-21 | End: 1900-01-01

## 2018-02-16 ENCOUNTER — APPOINTMENT (OUTPATIENT)
Dept: INTERNAL MEDICINE | Facility: CLINIC | Age: 83
End: 2018-02-16
Payer: MEDICARE

## 2018-02-16 VITALS
HEIGHT: 68 IN | WEIGHT: 170 LBS | RESPIRATION RATE: 14 BRPM | OXYGEN SATURATION: 97 % | DIASTOLIC BLOOD PRESSURE: 75 MMHG | BODY MASS INDEX: 25.76 KG/M2 | TEMPERATURE: 97.5 F | SYSTOLIC BLOOD PRESSURE: 122 MMHG | HEART RATE: 68 BPM

## 2018-02-16 DIAGNOSIS — E78.5 HYPERLIPIDEMIA, UNSPECIFIED: ICD-10-CM

## 2018-02-16 PROCEDURE — 99214 OFFICE O/P EST MOD 30 MIN: CPT

## 2018-02-16 RX ORDER — VALSARTAN 80 MG/1
80 TABLET, COATED ORAL
Qty: 90 | Refills: 0 | Status: ACTIVE | COMMUNITY
Start: 2018-01-30

## 2018-02-16 RX ORDER — ZOSTER VACCINE RECOMBINANT, ADJUVANTED 50 MCG/0.5
50 KIT INTRAMUSCULAR
Qty: 1 | Refills: 0 | Status: ACTIVE | COMMUNITY
Start: 2018-02-16 | End: 1900-01-01

## 2018-02-16 RX ORDER — METOPROLOL SUCCINATE 25 MG/1
25 TABLET, EXTENDED RELEASE ORAL
Qty: 90 | Refills: 0 | Status: ACTIVE | COMMUNITY
Start: 2018-01-30

## 2018-03-29 ENCOUNTER — APPOINTMENT (OUTPATIENT)
Dept: HEART AND VASCULAR | Facility: CLINIC | Age: 83
End: 2018-03-29

## 2018-04-09 ENCOUNTER — RX RENEWAL (OUTPATIENT)
Age: 83
End: 2018-04-09

## 2018-04-15 ENCOUNTER — RX RENEWAL (OUTPATIENT)
Age: 83
End: 2018-04-15

## 2018-08-17 ENCOUNTER — INPATIENT (INPATIENT)
Facility: HOSPITAL | Age: 83
LOS: 0 days | Discharge: HOME HEALTH | DRG: 281 | End: 2018-08-18
Attending: INTERNAL MEDICINE | Admitting: INTERNAL MEDICINE
Payer: MEDICARE

## 2018-08-17 VITALS
HEART RATE: 77 BPM | WEIGHT: 175.05 LBS | TEMPERATURE: 98 F | SYSTOLIC BLOOD PRESSURE: 174 MMHG | DIASTOLIC BLOOD PRESSURE: 78 MMHG | OXYGEN SATURATION: 96 % | RESPIRATION RATE: 18 BRPM

## 2018-08-17 DIAGNOSIS — E11.9 TYPE 2 DIABETES MELLITUS WITHOUT COMPLICATIONS: ICD-10-CM

## 2018-08-17 DIAGNOSIS — R63.8 OTHER SYMPTOMS AND SIGNS CONCERNING FOOD AND FLUID INTAKE: ICD-10-CM

## 2018-08-17 DIAGNOSIS — I63.9 CEREBRAL INFARCTION, UNSPECIFIED: ICD-10-CM

## 2018-08-17 DIAGNOSIS — Z95.810 PRESENCE OF AUTOMATIC (IMPLANTABLE) CARDIAC DEFIBRILLATOR: ICD-10-CM

## 2018-08-17 DIAGNOSIS — I48.2 CHRONIC ATRIAL FIBRILLATION: ICD-10-CM

## 2018-08-17 DIAGNOSIS — I50.9 HEART FAILURE, UNSPECIFIED: ICD-10-CM

## 2018-08-17 DIAGNOSIS — Z95.5 PRESENCE OF CORONARY ANGIOPLASTY IMPLANT AND GRAFT: ICD-10-CM

## 2018-08-17 DIAGNOSIS — I08.0 RHEUMATIC DISORDERS OF BOTH MITRAL AND AORTIC VALVES: ICD-10-CM

## 2018-08-17 DIAGNOSIS — I21.4 NON-ST ELEVATION (NSTEMI) MYOCARDIAL INFARCTION: ICD-10-CM

## 2018-08-17 DIAGNOSIS — Z86.010 PERSONAL HISTORY OF COLONIC POLYPS: ICD-10-CM

## 2018-08-17 DIAGNOSIS — R26.89 OTHER ABNORMALITIES OF GAIT AND MOBILITY: ICD-10-CM

## 2018-08-17 DIAGNOSIS — Z79.01 LONG TERM (CURRENT) USE OF ANTICOAGULANTS: ICD-10-CM

## 2018-08-17 DIAGNOSIS — I25.5 ISCHEMIC CARDIOMYOPATHY: ICD-10-CM

## 2018-08-17 DIAGNOSIS — I35.0 NONRHEUMATIC AORTIC (VALVE) STENOSIS: ICD-10-CM

## 2018-08-17 DIAGNOSIS — I11.0 HYPERTENSIVE HEART DISEASE WITH HEART FAILURE: ICD-10-CM

## 2018-08-17 DIAGNOSIS — I25.82 CHRONIC TOTAL OCCLUSION OF CORONARY ARTERY: ICD-10-CM

## 2018-08-17 DIAGNOSIS — I25.719 ATHEROSCLEROSIS OF AUTOLOGOUS VEIN CORONARY ARTERY BYPASS GRAFT(S) WITH UNSPECIFIED ANGINA PECTORIS: ICD-10-CM

## 2018-08-17 DIAGNOSIS — Z95.1 PRESENCE OF AORTOCORONARY BYPASS GRAFT: ICD-10-CM

## 2018-08-17 DIAGNOSIS — E78.5 HYPERLIPIDEMIA, UNSPECIFIED: ICD-10-CM

## 2018-08-17 DIAGNOSIS — I27.20 PULMONARY HYPERTENSION, UNSPECIFIED: ICD-10-CM

## 2018-08-17 DIAGNOSIS — Z95.1 PRESENCE OF AORTOCORONARY BYPASS GRAFT: Chronic | ICD-10-CM

## 2018-08-17 DIAGNOSIS — R07.89 OTHER CHEST PAIN: ICD-10-CM

## 2018-08-17 DIAGNOSIS — Z02.9 ENCOUNTER FOR ADMINISTRATIVE EXAMINATIONS, UNSPECIFIED: ICD-10-CM

## 2018-08-17 DIAGNOSIS — I25.10 ATHEROSCLEROTIC HEART DISEASE OF NATIVE CORONARY ARTERY WITHOUT ANGINA PECTORIS: ICD-10-CM

## 2018-08-17 DIAGNOSIS — Z28.89 IMMUNIZATION NOT CARRIED OUT FOR OTHER REASON: ICD-10-CM

## 2018-08-17 DIAGNOSIS — F03.90 UNSPECIFIED DEMENTIA WITHOUT BEHAVIORAL DISTURBANCE: ICD-10-CM

## 2018-08-17 DIAGNOSIS — Z79.84 LONG TERM (CURRENT) USE OF ORAL HYPOGLYCEMIC DRUGS: ICD-10-CM

## 2018-08-17 DIAGNOSIS — Z98.890 OTHER SPECIFIED POSTPROCEDURAL STATES: ICD-10-CM

## 2018-08-17 DIAGNOSIS — I25.119 ATHEROSCLEROTIC HEART DISEASE OF NATIVE CORONARY ARTERY WITH UNSPECIFIED ANGINA PECTORIS: ICD-10-CM

## 2018-08-17 DIAGNOSIS — I69.998 OTHER SEQUELAE FOLLOWING UNSPECIFIED CEREBROVASCULAR DISEASE: ICD-10-CM

## 2018-08-17 DIAGNOSIS — Z88.0 ALLERGY STATUS TO PENICILLIN: ICD-10-CM

## 2018-08-17 DIAGNOSIS — I50.22 CHRONIC SYSTOLIC (CONGESTIVE) HEART FAILURE: ICD-10-CM

## 2018-08-17 DIAGNOSIS — Z95.0 PRESENCE OF CARDIAC PACEMAKER: ICD-10-CM

## 2018-08-17 DIAGNOSIS — Z95.0 PRESENCE OF CARDIAC PACEMAKER: Chronic | ICD-10-CM

## 2018-08-17 PROBLEM — I44.7 LEFT BUNDLE-BRANCH BLOCK, UNSPECIFIED: Chronic | Status: ACTIVE | Noted: 2017-08-11

## 2018-08-17 LAB
ALBUMIN SERPL ELPH-MCNC: 4.4 G/DL — SIGNIFICANT CHANGE UP (ref 3.3–5)
ALP SERPL-CCNC: 68 U/L — SIGNIFICANT CHANGE UP (ref 40–120)
ALT FLD-CCNC: 19 U/L — SIGNIFICANT CHANGE UP (ref 10–45)
ANION GAP SERPL CALC-SCNC: 13 MMOL/L — SIGNIFICANT CHANGE UP (ref 5–17)
ANION GAP SERPL CALC-SCNC: 15 MMOL/L — SIGNIFICANT CHANGE UP (ref 5–17)
APPEARANCE UR: CLEAR — SIGNIFICANT CHANGE UP
APTT BLD: 36.8 SEC — SIGNIFICANT CHANGE UP (ref 27.5–37.4)
APTT BLD: 38.1 SEC — HIGH (ref 27.5–37.4)
AST SERPL-CCNC: 33 U/L — SIGNIFICANT CHANGE UP (ref 10–40)
BASOPHILS NFR BLD AUTO: 0.3 % — SIGNIFICANT CHANGE UP (ref 0–2)
BILIRUB SERPL-MCNC: 0.5 MG/DL — SIGNIFICANT CHANGE UP (ref 0.2–1.2)
BILIRUB UR-MCNC: NEGATIVE — SIGNIFICANT CHANGE UP
BUN SERPL-MCNC: 23 MG/DL — SIGNIFICANT CHANGE UP (ref 7–23)
BUN SERPL-MCNC: 26 MG/DL — HIGH (ref 7–23)
CALCIUM SERPL-MCNC: 9.8 MG/DL — SIGNIFICANT CHANGE UP (ref 8.4–10.5)
CALCIUM SERPL-MCNC: 9.9 MG/DL — SIGNIFICANT CHANGE UP (ref 8.4–10.5)
CHLORIDE SERPL-SCNC: 94 MMOL/L — LOW (ref 96–108)
CHLORIDE SERPL-SCNC: 96 MMOL/L — SIGNIFICANT CHANGE UP (ref 96–108)
CHOLEST SERPL-MCNC: 198 MG/DL — SIGNIFICANT CHANGE UP (ref 10–199)
CK MB CFR SERPL CALC: 16.6 NG/ML — HIGH (ref 0–6.7)
CK MB CFR SERPL CALC: 21.7 NG/ML — HIGH (ref 0–6.7)
CK MB CFR SERPL CALC: 24.4 NG/ML — HIGH (ref 0–6.7)
CK MB CFR SERPL CALC: 9.1 NG/ML — HIGH (ref 0–6.7)
CK SERPL-CCNC: 151 U/L — SIGNIFICANT CHANGE UP (ref 30–200)
CK SERPL-CCNC: 179 U/L — SIGNIFICANT CHANGE UP (ref 30–200)
CK SERPL-CCNC: 208 U/L — HIGH (ref 30–200)
CK SERPL-CCNC: 218 U/L — HIGH (ref 30–200)
CO2 SERPL-SCNC: 25 MMOL/L — SIGNIFICANT CHANGE UP (ref 22–31)
CO2 SERPL-SCNC: 25 MMOL/L — SIGNIFICANT CHANGE UP (ref 22–31)
COLOR SPEC: YELLOW — SIGNIFICANT CHANGE UP
CREAT SERPL-MCNC: 1.18 MG/DL — SIGNIFICANT CHANGE UP (ref 0.5–1.3)
CREAT SERPL-MCNC: 1.39 MG/DL — HIGH (ref 0.5–1.3)
DIFF PNL FLD: NEGATIVE — SIGNIFICANT CHANGE UP
EOSINOPHIL NFR BLD AUTO: 3.7 % — SIGNIFICANT CHANGE UP (ref 0–6)
EXTRA GREEN TOP TUBE: SIGNIFICANT CHANGE UP
GLUCOSE BLDC GLUCOMTR-MCNC: 135 MG/DL — HIGH (ref 70–99)
GLUCOSE BLDC GLUCOMTR-MCNC: 144 MG/DL — HIGH (ref 70–99)
GLUCOSE SERPL-MCNC: 138 MG/DL — HIGH (ref 70–99)
GLUCOSE SERPL-MCNC: 184 MG/DL — HIGH (ref 70–99)
GLUCOSE UR QL: NEGATIVE — SIGNIFICANT CHANGE UP
HBA1C BLD-MCNC: 6.7 % — HIGH (ref 4–5.6)
HCT VFR BLD CALC: 41.4 % — SIGNIFICANT CHANGE UP (ref 39–50)
HCT VFR BLD CALC: 42.3 % — SIGNIFICANT CHANGE UP (ref 39–50)
HDLC SERPL-MCNC: 42 MG/DL — SIGNIFICANT CHANGE UP
HGB BLD-MCNC: 13.1 G/DL — SIGNIFICANT CHANGE UP (ref 13–17)
HGB BLD-MCNC: 13.3 G/DL — SIGNIFICANT CHANGE UP (ref 13–17)
INR BLD: 1.36 — HIGH (ref 0.88–1.16)
INR BLD: 1.48 — HIGH (ref 0.88–1.16)
KETONES UR-MCNC: NEGATIVE — SIGNIFICANT CHANGE UP
LEUKOCYTE ESTERASE UR-ACNC: NEGATIVE — SIGNIFICANT CHANGE UP
LIDOCAIN IGE QN: 43 U/L — SIGNIFICANT CHANGE UP (ref 7–60)
LIPID PNL WITH DIRECT LDL SERPL: 134 MG/DL — HIGH
LYMPHOCYTES # BLD AUTO: 10.4 % — LOW (ref 13–44)
MAGNESIUM SERPL-MCNC: 2.2 MG/DL — SIGNIFICANT CHANGE UP (ref 1.6–2.6)
MAGNESIUM SERPL-MCNC: 2.3 MG/DL — SIGNIFICANT CHANGE UP (ref 1.6–2.6)
MCHC RBC-ENTMCNC: 29.2 PG — SIGNIFICANT CHANGE UP (ref 27–34)
MCHC RBC-ENTMCNC: 30.1 PG — SIGNIFICANT CHANGE UP (ref 27–34)
MCHC RBC-ENTMCNC: 31 G/DL — LOW (ref 32–36)
MCHC RBC-ENTMCNC: 32.1 G/DL — SIGNIFICANT CHANGE UP (ref 32–36)
MCV RBC AUTO: 93.7 FL — SIGNIFICANT CHANGE UP (ref 80–100)
MCV RBC AUTO: 94.4 FL — SIGNIFICANT CHANGE UP (ref 80–100)
MONOCYTES NFR BLD AUTO: 8.2 % — SIGNIFICANT CHANGE UP (ref 2–14)
NEUTROPHILS NFR BLD AUTO: 77.4 % — HIGH (ref 43–77)
NITRITE UR-MCNC: NEGATIVE — SIGNIFICANT CHANGE UP
NT-PROBNP SERPL-SCNC: 1325 PG/ML — HIGH (ref 0–300)
PH UR: 5.5 — SIGNIFICANT CHANGE UP (ref 5–8)
PLATELET # BLD AUTO: 190 K/UL — SIGNIFICANT CHANGE UP (ref 150–400)
PLATELET # BLD AUTO: 197 K/UL — SIGNIFICANT CHANGE UP (ref 150–400)
POTASSIUM SERPL-MCNC: 4.6 MMOL/L — SIGNIFICANT CHANGE UP (ref 3.5–5.3)
POTASSIUM SERPL-MCNC: 4.8 MMOL/L — SIGNIFICANT CHANGE UP (ref 3.5–5.3)
POTASSIUM SERPL-SCNC: 4.6 MMOL/L — SIGNIFICANT CHANGE UP (ref 3.5–5.3)
POTASSIUM SERPL-SCNC: 4.8 MMOL/L — SIGNIFICANT CHANGE UP (ref 3.5–5.3)
PROT SERPL-MCNC: 7.3 G/DL — SIGNIFICANT CHANGE UP (ref 6–8.3)
PROT UR-MCNC: NEGATIVE MG/DL — SIGNIFICANT CHANGE UP
PROTHROM AB SERPL-ACNC: 15.2 SEC — HIGH (ref 9.8–12.7)
PROTHROM AB SERPL-ACNC: 16.6 SEC — HIGH (ref 9.8–12.7)
RBC # BLD: 4.42 M/UL — SIGNIFICANT CHANGE UP (ref 4.2–5.8)
RBC # BLD: 4.48 M/UL — SIGNIFICANT CHANGE UP (ref 4.2–5.8)
RBC # FLD: 13.1 % — SIGNIFICANT CHANGE UP (ref 10.3–16.9)
RBC # FLD: 13.2 % — SIGNIFICANT CHANGE UP (ref 10.3–16.9)
SODIUM SERPL-SCNC: 134 MMOL/L — LOW (ref 135–145)
SODIUM SERPL-SCNC: 134 MMOL/L — LOW (ref 135–145)
SP GR SPEC: 1.01 — SIGNIFICANT CHANGE UP (ref 1–1.03)
TOTAL CHOLESTEROL/HDL RATIO MEASUREMENT: 4.7 RATIO — SIGNIFICANT CHANGE UP (ref 3.4–9.6)
TRIGL SERPL-MCNC: 112 MG/DL — SIGNIFICANT CHANGE UP (ref 10–149)
TROPONIN T SERPL-MCNC: 0.03 NG/ML — HIGH (ref 0–0.01)
TROPONIN T SERPL-MCNC: 0.15 NG/ML — CRITICAL HIGH (ref 0–0.01)
TROPONIN T SERPL-MCNC: 0.25 NG/ML — CRITICAL HIGH (ref 0–0.01)
TROPONIN T SERPL-MCNC: 0.26 NG/ML — CRITICAL HIGH (ref 0–0.01)
UROBILINOGEN FLD QL: 0.2 E.U./DL — SIGNIFICANT CHANGE UP
WBC # BLD: 7.5 K/UL — SIGNIFICANT CHANGE UP (ref 3.8–10.5)
WBC # BLD: 7.9 K/UL — SIGNIFICANT CHANGE UP (ref 3.8–10.5)
WBC # FLD AUTO: 7.5 K/UL — SIGNIFICANT CHANGE UP (ref 3.8–10.5)
WBC # FLD AUTO: 7.9 K/UL — SIGNIFICANT CHANGE UP (ref 3.8–10.5)

## 2018-08-17 PROCEDURE — 99223 1ST HOSP IP/OBS HIGH 75: CPT

## 2018-08-17 PROCEDURE — 71046 X-RAY EXAM CHEST 2 VIEWS: CPT | Mod: 26

## 2018-08-17 PROCEDURE — 93010 ELECTROCARDIOGRAM REPORT: CPT

## 2018-08-17 PROCEDURE — 93284 PRGRMG EVAL IMPLANTABLE DFB: CPT | Mod: 26

## 2018-08-17 PROCEDURE — 93306 TTE W/DOPPLER COMPLETE: CPT | Mod: 26

## 2018-08-17 PROCEDURE — 93461 R&L HRT ART/VENTRICLE ANGIO: CPT | Mod: 26

## 2018-08-17 PROCEDURE — 99285 EMERGENCY DEPT VISIT HI MDM: CPT | Mod: 25

## 2018-08-17 RX ORDER — METFORMIN HYDROCHLORIDE 850 MG/1
0 TABLET ORAL
Qty: 0 | Refills: 0 | COMMUNITY

## 2018-08-17 RX ORDER — ATORVASTATIN CALCIUM 80 MG/1
40 TABLET, FILM COATED ORAL AT BEDTIME
Qty: 0 | Refills: 0 | Status: DISCONTINUED | OUTPATIENT
Start: 2018-08-17 | End: 2018-08-17

## 2018-08-17 RX ORDER — ACETAMINOPHEN 500 MG
650 TABLET ORAL EVERY 6 HOURS
Qty: 0 | Refills: 0 | Status: DISCONTINUED | OUTPATIENT
Start: 2018-08-17 | End: 2018-08-18

## 2018-08-17 RX ORDER — ATORVASTATIN CALCIUM 80 MG/1
1 TABLET, FILM COATED ORAL
Qty: 0 | Refills: 0 | COMMUNITY

## 2018-08-17 RX ORDER — DIGOXIN 250 MCG
0 TABLET ORAL
Qty: 0 | Refills: 0 | COMMUNITY

## 2018-08-17 RX ORDER — INSULIN LISPRO 100/ML
VIAL (ML) SUBCUTANEOUS
Qty: 0 | Refills: 0 | Status: DISCONTINUED | OUTPATIENT
Start: 2018-08-17 | End: 2018-08-18

## 2018-08-17 RX ORDER — CLOPIDOGREL BISULFATE 75 MG/1
300 TABLET, FILM COATED ORAL ONCE
Qty: 0 | Refills: 0 | Status: COMPLETED | OUTPATIENT
Start: 2018-08-17 | End: 2018-08-17

## 2018-08-17 RX ORDER — HEPARIN SODIUM 5000 [USP'U]/ML
INJECTION INTRAVENOUS; SUBCUTANEOUS
Qty: 25000 | Refills: 0 | Status: DISCONTINUED | OUTPATIENT
Start: 2018-08-17 | End: 2018-08-17

## 2018-08-17 RX ORDER — ASPIRIN/CALCIUM CARB/MAGNESIUM 324 MG
325 TABLET ORAL ONCE
Qty: 0 | Refills: 0 | Status: COMPLETED | OUTPATIENT
Start: 2018-08-17 | End: 2018-08-17

## 2018-08-17 RX ORDER — METOPROLOL TARTRATE 50 MG
50 TABLET ORAL DAILY
Qty: 0 | Refills: 0 | Status: DISCONTINUED | OUTPATIENT
Start: 2018-08-17 | End: 2018-08-18

## 2018-08-17 RX ORDER — DIGOXIN 250 MCG
0.12 TABLET ORAL DAILY
Qty: 0 | Refills: 0 | Status: DISCONTINUED | OUTPATIENT
Start: 2018-08-17 | End: 2018-08-18

## 2018-08-17 RX ORDER — VALSARTAN 80 MG/1
40 TABLET ORAL
Qty: 0 | Refills: 0 | COMMUNITY

## 2018-08-17 RX ORDER — CLOPIDOGREL BISULFATE 75 MG/1
300 TABLET, FILM COATED ORAL ONCE
Qty: 0 | Refills: 0 | Status: DISCONTINUED | OUTPATIENT
Start: 2018-08-17 | End: 2018-08-17

## 2018-08-17 RX ORDER — POTASSIUM CHLORIDE 20 MEQ
10 PACKET (EA) ORAL DAILY
Qty: 0 | Refills: 0 | Status: DISCONTINUED | OUTPATIENT
Start: 2018-08-17 | End: 2018-08-18

## 2018-08-17 RX ORDER — FUROSEMIDE 40 MG
40 TABLET ORAL DAILY
Qty: 0 | Refills: 0 | Status: DISCONTINUED | OUTPATIENT
Start: 2018-08-17 | End: 2018-08-18

## 2018-08-17 RX ADMIN — HEPARIN SODIUM 950 UNIT(S)/HR: 5000 INJECTION INTRAVENOUS; SUBCUTANEOUS at 09:57

## 2018-08-17 RX ADMIN — Medication 0.12 MILLIGRAM(S): at 08:37

## 2018-08-17 RX ADMIN — CLOPIDOGREL BISULFATE 300 MILLIGRAM(S): 75 TABLET, FILM COATED ORAL at 08:00

## 2018-08-17 RX ADMIN — Medication 40 MILLIGRAM(S): at 13:03

## 2018-08-17 RX ADMIN — Medication 50 MILLIGRAM(S): at 08:37

## 2018-08-17 RX ADMIN — Medication 10 MILLIEQUIVALENT(S): at 13:03

## 2018-08-17 RX ADMIN — Medication 325 MILLIGRAM(S): at 06:35

## 2018-08-17 RX ADMIN — CLOPIDOGREL BISULFATE 300 MILLIGRAM(S): 75 TABLET, FILM COATED ORAL at 15:39

## 2018-08-17 NOTE — ED ADULT NURSE REASSESSMENT NOTE - NS ED NURSE REASSESS COMMENT FT1
Pt awake/alert, breathing RA freely, speaking clearly in full sentences, skin warm and dry; appears comfortable. Cardiology consult at bedside. Family at bedside. Continuos cardiac monitoring ongoing. Awaiting x-ray results; will continue to monitor.

## 2018-08-17 NOTE — ED PROVIDER NOTE - ATTENDING CONTRIBUTION TO CARE
HX DM CAD CABG  PPM, dementia/ mental decline   awoke tonight with CP / diaphoresis and thus to ED for care on Eliquis not asa , Digoxin , lasix ( metoprolol, and Valsartan ( changed to ? ) No CP on arrival to ED History primarily provided by daughter both translating and primary caretaker    agree with above    Labs show uptrending cardiac troponins in the setting of chest pain, seen by cards fellow and interrogated PPM without events , pt requires inpt mgmt for nstemi, tx as per cards

## 2018-08-17 NOTE — H&P ADULT - PROBLEM SELECTOR PLAN 7
#N: NPO pending possible cardiac cath  #E: Replete lytes K > 4 Mag > 2  #C: FULL CODE   Goals of care with daughter discussed. Per daughter, father would probably want to be a DNR, however, at this time wants all measures performed     DVT PPX: IV heparin gtt

## 2018-08-17 NOTE — CONSULT NOTE ADULT - ASSESSMENT
82 year old male with history of CVA with no residual deficits, dementia, chronic Afib, rate controlled, CAD s/p CABG, chronic HFrEF with EF 25% and severe AS with LAWRENCE 0.9cm2 on ECHO from 8/2017 who initially presented to [facility] ED on 8/17/18 with non-radiating substernal chest pain that lasts 30 minutes with spontaneous resolution.  In ED, he was noted to be hypertensive with /78mmHg, HR 77bpm, saturating well on RA, afebrile with troponin 0.03, BNP 1325, with BUN/Cr 26/1.39.  He was ASA and plavix loaded, started on heparin gtt and admitted to cardiology for further evaluation and management.  Repeat ECHO on admission demonstrated severely dilated LA, mild LVH, hypokinesis of basal and mid-inferolateral, and mid-anterolateral LV wall with EF 40% with at least moderate aortic stenosis with mean/peak gradients 13mmHg/24mmHg and moderate MR with PASP 62mmHg and small pericardial effusion with stroke volume 26cc/m2.  During his admission, serial troponins have continued to trend upward and on 8/17/18, he underwent diagnostic R/LHC which revealed Native 3VCAD: pRCA 50%, dRCA 100%, dLM 30%, pLCx 30%, pLAD 100%, pD1 50%, LIMA to LAD patent, SVG-RPDA patent, SVG-OM1 occluded, non-obstructive LCx, globally reduced EF 35-40%, EDP 21mmHg, Aortic valve 1.0, moderate AS, mean gradient 31mmHg; RHC: RA 2, RV 56/1, PA 56/13 mean 27, PCWP 20, PA sat 62%, CO 4.7, CI 2.4.  Dr. Keller, Structural Heart, was consulted for procedural evaluation of possible low flow moderate aortic stenosis.      Discussed patient with Dr. Keller    1. Moderate aortic stenosis  -Will f/u cath results and make further recommendations  -Will continue to follow  -Medical management per primary team.     Thank you for this consultation. 82 year old male with history of CVA with no residual deficits, dementia, chronic Afib, rate controlled, CAD s/p CABG, chronic HFrEF with EF 25% and severe AS with LAWRENCE 0.9cm2 on ECHO from 8/2017 who initially presented to [facility] ED on 8/17/18 with non-radiating substernal chest pain that lasts 30 minutes with spontaneous resolution.  In ED, he was noted to be hypertensive with /78mmHg, HR 77bpm, saturating well on RA, afebrile with troponin 0.03, BNP 1325, with BUN/Cr 26/1.39.  He was ASA and plavix loaded, started on heparin gtt and admitted to cardiology for further evaluation and management.  Repeat ECHO on admission demonstrated severely dilated LA, mild LVH, hypokinesis of basal and mid-inferolateral, and mid-anterolateral LV wall with EF 40% with at least moderate aortic stenosis with mean/peak gradients 13mmHg/24mmHg and moderate MR with PASP 62mmHg and small pericardial effusion with stroke volume 26cc/m2.  During his admission, serial troponins have continued to trend upward and on 8/17/18, he underwent diagnostic R/LHC which revealed Native 3VCAD: pRCA 50%, dRCA 100%, dLM 30%, pLCx 30%, pLAD 100%, pD1 50%, LIMA to LAD patent, SVG-RPDA patent, SVG-OM1 occluded, non-obstructive LCx, globally reduced EF 35-40%, EDP 21mmHg, Aortic valve 1.0, moderate AS, mean gradient 31mmHg; RHC: RA 2, RV 56/1, PA 56/13 mean 27, PCWP 20, PA sat 62%, CO 4.7, CI 2.4.  Dr. Keller, Structural Heart, was consulted for procedural evaluation of possible low flow moderate aortic stenosis.      Discussed patient with Dr. Keller    1. Moderate aortic stenosis  -Will f/u cath results and make further recommendation  -Will continue to follow  -Medical management per primary team.     Thank you for this consultation.

## 2018-08-17 NOTE — H&P ADULT - NSHPLABSRESULTS_GEN_ALL_CORE
13.1   7.5   )-----------( 197      ( 17 Aug 2018 10:33 )             42.3           134<L>  |  94<L>  |  26<H>  ----------------------------<  184<H>  4.8   |  25  |  1.39<H>    Ca    9.9      17 Aug 2018 02:22  Mg     2.2         TPro  7.3  /  Alb  4.4  /  TBili  0.5  /  DBili  x   /  AST  33  /  ALT  19  /  AlkPhos  68        PT/INR - ( 17 Aug 2018 10:33 )   PT: 15.2 sec;   INR: 1.36          PTT - ( 17 Aug 2018 10:33 )  PTT:38.1 sec    CARDIAC MARKERS ( 17 Aug 2018 05:47 )  x     / 0.15 ng/mL / 218 U/L / x     / 21.7 ng/mL  CARDIAC MARKERS ( 17 Aug 2018 02:22 )  x     / 0.03 ng/mL / 151 U/L / x     / 9.1 ng/mL        Urinalysis Basic - ( 17 Aug 2018 04:10 )    Color: Yellow / Appearance: Clear / S.015 / pH: x  Gluc: x / Ketone: NEGATIVE  / Bili: Negative / Urobili: 0.2 E.U./dL   Blood: x / Protein: NEGATIVE mg/dL / Nitrite: NEGATIVE   Leuk Esterase: NEGATIVE / RBC: x / WBC x   Sq Epi: x / Non Sq Epi: x / Bacteria: x        EKG:

## 2018-08-17 NOTE — H&P ADULT - NSHPREVIEWOFSYSTEMS_GEN_ALL_CORE
GENERAL, CONSTITUTIONAL : denies recent weight loss, fever, chills  EYES, VISION: denies changes in vision   EARS, NOSE, THROAT: denies hearing loss  HEART, CARDIOVASCULAR: denies chest pain at this time. c/o CP x 30 min while at home. Denies arrhythmia, palpitations, SOB,  LE edema, claudication  RESPIRATORY: Denies cough, SOB, wheezing, PND, orthopnea  GASTROINTESTINAL: Denies abdominal pain, epigastric pain, nausea, vomiting, or hematemesis, diarrhea, constipation, melena or hematochezia.  GENITOURINARY: Denies frequent urination, urgency  MUSCULOSKELETAL denies joint pain or swelling, restricted motion, musculoskeletal pain.   SKIN & INTEGUMENTARY Denies rashes, sores, blisters, blisters, growths.  NEUROLOGICAL: Denies numbness or tingling sensations, sensation loss, burning.   PSYCHIATRIC: Denies nervousness, anxiety, depression  ENDOCRINE Denies heat or cold intolerance, excessive thirst  HEMATOLOGIC/LYMPHATIC: Denies abnormal bleeding, bleeding of any kind

## 2018-08-17 NOTE — H&P ADULT - PROBLEM SELECTOR PLAN 1
P/W CP x 30 min. Trop uptrending 0.26 now. EKG without acute ischemic changes. s/p ASA/ Plavix load and started on IV Heparin gtt . Patient also with known hx AS (0.9 cm). CP likely 2/2 ischemia vs. worsening AS   - ECHO performed, f/u results  - trend trop to peak   - plan for medical mgmt vs. cath - discussing with daughter and pt private cardiologist   - c/w Toprol XL 50   - c/w Atorvastatin 40   - c/w ASA 81 and Plavix 75

## 2018-08-17 NOTE — PATIENT PROFILE ADULT. - PMH
Atrial fibrillation, permanent  with slow ventricular response  CAD (coronary artery disease)    CHF (congestive heart failure)    CVA (cerebral vascular accident)    DM (diabetes mellitus)    LBBB (left bundle branch block)

## 2018-08-17 NOTE — H&P ADULT - HISTORY OF PRESENT ILLNESS
82 y/oM PMHx permanent Afib with slow VR s/p single chamber PPM s/p upgrade BiV ICD (7/2017), CVA, HTN, CAD s/p CABG, Systolic Heart Failure (EF 25% 2017) and dementia presented 8/17 with CP. 82 y/oM PMHx permanent Afib with slow VR s/p single chamber PPM s/p upgrade BiV ICD (7/2017), CVA, HTN, CAD s/p CABG, Systolic Heart Failure (EF 25% 2017) and dementia presented 8/17 with CP.  Patient lives at home with daughter who is his primary care taker and expressed chest pain to her while at home, which prompted her to being him to the emergency room. Patient poor historian as has baseline dementia. Chest pain non-radiating only lasting about 30 minutes as per daughter without other noted associated sx of SOB, dizziness/diaphoresis, n/v, palpitations.   In ED, VS: T 97.7 HR 77 /78 RR 18 SpO2 96%RA. EKG V-Paced @ 61BPM. CXR without infiltrates or effusions. Labs notable for Trop 0.03, BNP 1325, BUN/Creat 26/1.39. Given ASA 325mg, Plavix 300 mg and started on IV Heparin gtt. Patient admitted to tele [facility] for further management.

## 2018-08-17 NOTE — ED ADULT NURSE NOTE - OBJECTIVE STATEMENT
Pt aaox3, breathing RA freely, skin warm and dry; appears comfortable. Complaining of chest pain x 30min. Gait steady. Awaiting lab and x-ray results. Continuos cardiac monitoring ongoing. Family at bedside for interpretation. Will continue to monitor.

## 2018-08-17 NOTE — ED ADULT NURSE NOTE - NSIMPLEMENTINTERV_GEN_ALL_ED
Implemented All Fall Risk Interventions:  [place] to call system. Call bell, personal items and telephone within reach. Instruct patient to call for assistance. Room bathroom lighting operational. Non-slip footwear when patient is off stretcher. Physically safe environment: no spills, clutter or unnecessary equipment. Stretcher in lowest position, wheels locked, appropriate side rails in place. Provide visual cue, wrist band, yellow gown, etc. Monitor gait and stability. Monitor for mental status changes and reorient to person, place, and time. Review medications for side effects contributing to fall risk. Reinforce activity limits and safety measures with patient and family.

## 2018-08-17 NOTE — CONSULT NOTE ADULT - SUBJECTIVE AND OBJECTIVE BOX
Surgeon: Dr. Keller     Requesting Physician: Dr. Barrera     HISTORY OF PRESENT ILLNESS:  This is an 82 year old male with history of CVA with no residual deficits, dementia, chronic Afib, rate controlled, CAD s/p CABG, chronic HFrEF with EF 25% and severe AS with LAWRENCE 0.9cm2 on ECHO from 2017 who initially presented to [facility] ED on 18 with non-radiating substernal chest pain that lasts 30 minutes with spontaneous resolution.  In ED, he was noted to be hypertensive with /78mmHg, HR 77bpm, saturating well on RA, afebrile with troponin 0.03, BNP 1325, with BUN/Cr 26/1.39.  He was ASA and plavix loaded, started on heparin gtt and admitted to cardiology for further evaluation and management.  Repeat ECHO on admission demonstrated severely dilated LA, mild LVH, hypokinesis of basal and mid-inferolateral, and mid-anterolateral LV wall with EF 40% with at least moderate aortic stenosis with mean/peak gradients 13mmHg/24mmHg and moderate MR with PASP 62mmHg and small pericardial effusion with stroke volume 26cc/m2.  During his admission, serial troponins have continued to trend upward and on 18, he underwent diagnostic R/LHC which revealed [RESULTS].  Dr. Keller, Structural Heart, was consulted for procedural evaluation of possible low flow moderate aortic stenosis.      PAST MEDICAL & SURGICAL HISTORY:  Atrial fibrillation, permanent: with slow ventricular response  LBBB (left bundle branch block)  DM (diabetes mellitus)  CAD (coronary artery disease)  CHF (congestive heart failure)  CVA (cerebral vascular accident)  S/P placement of cardiac pacemaker  S/P CABG (coronary artery bypass graft)      MEDICATIONS  (STANDING):  atorvastatin 40 milliGRAM(s) Oral at bedtime  digoxin     Tablet 0.125 milliGRAM(s) Oral daily  furosemide    Tablet 40 milliGRAM(s) Oral daily  heparin  Infusion.  Unit(s)/Hr (9.5 mL/Hr) IV Continuous <Continuous>  insulin lispro (HumaLOG) corrective regimen sliding scale   SubCutaneous Before meals and at bedtime  metoprolol succinate ER 50 milliGRAM(s) Oral daily  potassium chloride    Tablet ER 10 milliEquivalent(s) Oral daily    MEDICATIONS  (PRN):      Allergies    penicillin (Other)    Intolerances    SOCIAL HISTORY:  Smoker:  YES / NO        PACK YEARS:                         WHEN QUIT?  ETOH use:  YES / NO               FREQUENCY / QUANTITY:  Ilicit Drug use:  YES / NO  Occupation:  Assisted device use (Cane / Walker):  Live with:    FAMILY HISTORY:  No pertinent family history in first degree relatives    Review of Systems  CONSTITUTIONAL:  Fevers / chills, sweats, fatigue, weight loss, weight gain                                    NEGATIVE  NEURO:  parathesias, seizures, syncope, confusion                                                                               NEGATIVE  EYES:  Blurry vision, discharge, pain, loss of vision                                                                                  NEGATIVE  ENMT:  Difficulty hearing, vertigo, dysphagia, epistaxis, recent dental work                                     NEGATIVE  CV:  Chest pain, palpitations, DHILLON, orthopnea                                                                                         NEGATIVE  RESPIRATORY:  Wheezing, SOB, cough / sputum, hemoptysis                                                              NEGATIVE  GI:  Nausea, vommiting, diarrhea, constipation, melena                                                                        NEGATIVE  : Hematuria, dysuria, urgency, incontinence                                                                                       NEGATIVE  MUSKULOSKELETAL:  arthritis, joint swelling, muscle weakness                                                           NEGATIVE  SKIN/BREAST:  rash, itching, jess loss, masses                                                                                            NEGATIVE  PSYCH:  depresion, anxiety, suicidal ideation                                                                                             NEGATIVE  HEME/LYMPH:  bruises easily, enlarged lymph nodes, tender lymph nodes                                        NEGATIVE  ENDOCRINE:  cold intolerance, heat intolerance, polydipsia                                                                   NEGATIVE    PHYSICAL EXAM  Vital Signs Last 24 Hrs  T(C): 36.2 (17 Aug 2018 14:39), Max: 36.8 (17 Aug 2018 07:01)  T(F): 97.2 (17 Aug 2018 14:39), Max: 98.2 (17 Aug 2018 07:01)  HR: 60 (17 Aug 2018 12:00) (59 - 77)  BP: 172/68 (17 Aug 2018 12:00) (149/80 - 179/84)  BP(mean): 107 (17 Aug 2018 12:00) (107 - 107)  RR: 16 (17 Aug 2018 12:00) (16 - 18)  SpO2: 95% (17 Aug 2018 12:00) (94% - 97%)    CONSTITUTIONAL:                                                                          WNL  NEURO:                                                                                             WNL                      EYES:                                                                                                  WNL  ENMT:                                                                                                WNL  CV:                                                                                                      WNL  RESPIRATORY:                                                                                  WNL  GI:                                                                                                       WNL  : CHACON + / -                                                                                 WNL  MUSKULOSKELETAL:                                                                       WNL  SKIN / BREAST:                                                                                 WNL  Extremities  Vascular                                                          LABS:                        13.1   7.5   )-----------( 197      ( 17 Aug 2018 10:33 )             42.3     08-17    134<L>  |  96  |  23  ----------------------------<  138<H>  4.6   |  25  |  1.18    Ca    9.8      17 Aug 2018 10:33  Mg     2.3         TPro  7.3  /  Alb  4.4  /  TBili  0.5  /  DBili  x   /  AST  33  /  ALT  19  /  AlkPhos  68      PT/INR - ( 17 Aug 2018 10:33 )   PT: 15.2 sec;   INR: 1.36          PTT - ( 17 Aug 2018 10:33 )  PTT:38.1 sec  Urinalysis Basic - ( 17 Aug 2018 04:10 )    Color: Yellow / Appearance: Clear / S.015 / pH: x  Gluc: x / Ketone: NEGATIVE  / Bili: Negative / Urobili: 0.2 E.U./dL   Blood: x / Protein: NEGATIVE mg/dL / Nitrite: NEGATIVE   Leuk Esterase: NEGATIVE / RBC: x / WBC x   Sq Epi: x / Non Sq Epi: x / Bacteria: x      CARDIAC MARKERS ( 17 Aug 2018 10:33 )  x     / 0.26 ng/mL / 208 U/L / x     / 24.4 ng/mL  CARDIAC MARKERS ( 17 Aug 2018 05:47 )  x     / 0.15 ng/mL / 218 U/L / x     / 21.7 ng/mL  CARDIAC MARKERS ( 17 Aug 2018 02:22 )  x     / 0.03 ng/mL / 151 U/L / x     / 9.1 ng/mL      Hemoglobin A1C, Whole Blood: 6.7 % ( @ 10:33)    Serum Pro-Brain Natriuretic Peptide: 1325 pg/mL (18 @ 02:22)      RADIOLOGY & ADDITIONAL STUDIES:  CAROTID U/S:    CXR:    CT Scan:    EKG:    TTE / KAYLI:    Cardiac Cath: Surgeon: Dr. Keller     Requesting Physician: Dr. Barrera     HISTORY OF PRESENT ILLNESS:  This is an 82 year old male with history of CVA with no residual deficits, dementia, chronic Afib, rate controlled, CAD s/p CABG, chronic HFrEF with EF 25% and severe AS with LAWRENCE 0.9cm2 on ECHO from 2017 who initially presented to [facility] ED on 18 with non-radiating substernal chest pain that lasts 30 minutes with spontaneous resolution.  In ED, he was noted to be hypertensive with /78mmHg, HR 77bpm, saturating well on RA, afebrile with troponin 0.03, BNP 1325, with BUN/Cr 26/1.39.  He was ASA and plavix loaded, started on heparin gtt and admitted to cardiology for further evaluation and management.  Repeat ECHO on admission demonstrated severely dilated LA, mild LVH, hypokinesis of basal and mid-inferolateral, and mid-anterolateral LV wall with EF 40% with at least moderate aortic stenosis with mean/peak gradients 13mmHg/24mmHg and moderate MR with PASP 62mmHg and small pericardial effusion with stroke volume 26cc/m2.  During his admission, serial troponins have continued to trend upward and on 18, he underwent diagnostic R/LHC which revealed Native 3VCAD: pRCA 50%, dRCA 100%, dLM 30%, pLCx 30%, pLAD 100%, pD1 50%, LIMA to LAD patent, SVG-RPDA patent, SVG-OM1 occluded, non-obstructive LCx, globally reduced EF 35-40%, EDP 21mmHg, Aortic valve 1.0, moderate AS, mean gradient 31mmHg; RHC: RA 2, RV 56/1, PA 56/13 mean 27, PCWP 20, PA sat 62%, CO 4.7, CI 2.4.  Dr. Keller, Structural Heart, was consulted for procedural evaluation of possible low flow moderate aortic stenosis.      PAST MEDICAL & SURGICAL HISTORY:  Atrial fibrillation, permanent: with slow ventricular response  LBBB (left bundle branch block)  DM (diabetes mellitus)  CAD (coronary artery disease)  CHF (congestive heart failure)  CVA (cerebral vascular accident)  S/P placement of cardiac pacemaker  S/P CABG (coronary artery bypass graft)      MEDICATIONS  (STANDING):  atorvastatin 40 milliGRAM(s) Oral at bedtime  digoxin     Tablet 0.125 milliGRAM(s) Oral daily  furosemide    Tablet 40 milliGRAM(s) Oral daily  heparin  Infusion.  Unit(s)/Hr (9.5 mL/Hr) IV Continuous <Continuous>  insulin lispro (HumaLOG) corrective regimen sliding scale   SubCutaneous Before meals and at bedtime  metoprolol succinate ER 50 milliGRAM(s) Oral daily  potassium chloride    Tablet ER 10 milliEquivalent(s) Oral daily    MEDICATIONS  (PRN):      Allergies    penicillin (Other)    Intolerances    SOCIAL HISTORY:  Smoker:  YES / NO        PACK YEARS:                         WHEN QUIT?  ETOH use:  YES / NO               FREQUENCY / QUANTITY:  Ilicit Drug use:  YES / NO  Occupation:  Assisted device use (Cane / Walker):  Live with:    FAMILY HISTORY:  No pertinent family history in first degree relatives    Review of Systems  CONSTITUTIONAL:  Fevers / chills, sweats, fatigue, weight loss, weight gain                                    NEGATIVE  NEURO:  parathesias, seizures, syncope, confusion                                                                               NEGATIVE  EYES:  Blurry vision, discharge, pain, loss of vision                                                                                  NEGATIVE  ENMT:  Difficulty hearing, vertigo, dysphagia, epistaxis, recent dental work                                     NEGATIVE  CV:  Chest pain, palpitations, DHILLON, orthopnea                                                                                         NEGATIVE  RESPIRATORY:  Wheezing, SOB, cough / sputum, hemoptysis                                                              NEGATIVE  GI:  Nausea, vommiting, diarrhea, constipation, melena                                                                        NEGATIVE  : Hematuria, dysuria, urgency, incontinence                                                                                       NEGATIVE  MUSKULOSKELETAL:  arthritis, joint swelling, muscle weakness                                                           NEGATIVE  SKIN/BREAST:  rash, itching, jess loss, masses                                                                                            NEGATIVE  PSYCH:  depresion, anxiety, suicidal ideation                                                                                             NEGATIVE  HEME/LYMPH:  bruises easily, enlarged lymph nodes, tender lymph nodes                                        NEGATIVE  ENDOCRINE:  cold intolerance, heat intolerance, polydipsia                                                                   NEGATIVE    PHYSICAL EXAM  Vital Signs Last 24 Hrs  T(C): 36.2 (17 Aug 2018 14:39), Max: 36.8 (17 Aug 2018 07:01)  T(F): 97.2 (17 Aug 2018 14:39), Max: 98.2 (17 Aug 2018 07:01)  HR: 60 (17 Aug 2018 12:00) (59 - 77)  BP: 172/68 (17 Aug 2018 12:00) (149/80 - 179/84)  BP(mean): 107 (17 Aug 2018 12:00) (107 - 107)  RR: 16 (17 Aug 2018 12:00) (16 - 18)  SpO2: 95% (17 Aug 2018 12:00) (94% - 97%)    CONSTITUTIONAL:                                                                          WNL  NEURO:                                                                                             WNL                      EYES:                                                                                                  WNL  ENMT:                                                                                                WNL  CV:                                                                                                      WNL  RESPIRATORY:                                                                                  WNL  GI:                                                                                                       WNL  : CHACON + / -                                                                                 WNL  MUSKULOSKELETAL:                                                                       WNL  SKIN / BREAST:                                                                                 WNL  Extremities  Vascular                                                          LABS:                        13.1   7.5   )-----------( 197      ( 17 Aug 2018 10:33 )             42.3         134<L>  |  96  |  23  ----------------------------<  138<H>  4.6   |  25  |  1.18    Ca    9.8      17 Aug 2018 10:33  Mg     2.3         TPro  7.3  /  Alb  4.4  /  TBili  0.5  /  DBili  x   /  AST  33  /  ALT  19  /  AlkPhos  68      PT/INR - ( 17 Aug 2018 10:33 )   PT: 15.2 sec;   INR: 1.36          PTT - ( 17 Aug 2018 10:33 )  PTT:38.1 sec  Urinalysis Basic - ( 17 Aug 2018 04:10 )    Color: Yellow / Appearance: Clear / S.015 / pH: x  Gluc: x / Ketone: NEGATIVE  / Bili: Negative / Urobili: 0.2 E.U./dL   Blood: x / Protein: NEGATIVE mg/dL / Nitrite: NEGATIVE   Leuk Esterase: NEGATIVE / RBC: x / WBC x   Sq Epi: x / Non Sq Epi: x / Bacteria: x      CARDIAC MARKERS ( 17 Aug 2018 10:33 )  x     / 0.26 ng/mL / 208 U/L / x     / 24.4 ng/mL  CARDIAC MARKERS ( 17 Aug 2018 05:47 )  x     / 0.15 ng/mL / 218 U/L / x     / 21.7 ng/mL  CARDIAC MARKERS ( 17 Aug 2018 02:22 )  x     / 0.03 ng/mL / 151 U/L / x     / 9.1 ng/mL      Hemoglobin A1C, Whole Blood: 6.7 % ( @ 10:33)    Serum Pro-Brain Natriuretic Peptide: 1325 pg/mL (18 @ 02:22)      RADIOLOGY & ADDITIONAL STUDIES:  CAROTID U/S:    CXR:    CT Scan:    EKG:    TTE / KAYLI:    Cardiac Cath: Surgeon: Dr. Keller     Requesting Physician: Dr. Barrera     HISTORY OF PRESENT ILLNESS:  This is an 82 year old male with history of CVA with no residual deficits, dementia, chronic Afib, rate controlled, CAD s/p CABG, chronic HFrEF with EF 25% and severe AS with LWARENCE 0.9cm2 on ECHO from 2017 who initially presented to [facility] ED on 18 with non-radiating substernal chest pain that lasts 30 minutes with spontaneous resolution.  In ED, he was noted to be hypertensive with /78mmHg, HR 77bpm, saturating well on RA, afebrile with troponin 0.03, BNP 1325, with BUN/Cr 26/1.39.  He was ASA and plavix loaded, started on heparin gtt and admitted to cardiology for further evaluation and management.  Repeat ECHO on admission demonstrated severely dilated LA, mild LVH, hypokinesis of basal and mid-inferolateral, and mid-anterolateral LV wall with EF 40% with at least moderate aortic stenosis with mean/peak gradients 13mmHg/24mmHg and moderate MR with PASP 62mmHg and small pericardial effusion with stroke volume 26cc/m2.  During his admission, serial troponins have continued to trend upward and on 18, he underwent diagnostic R/LHC which revealed Native 3VCAD: pRCA 50%, dRCA 100%, dLM 30%, pLCx 30%, pLAD 100%, pD1 50%, LIMA to LAD patent, SVG-RPDA patent, SVG-OM1 occluded, non-obstructive LCx, globally reduced EF 35-40%, EDP 21mmHg, Aortic valve 1.0, moderate AS, mean gradient 31mmHg; RHC: RA 2, RV 56/1, PA 56/13 mean 27, PCWP 20, PA sat 62%, CO 4.7, CI 2.4.  Dr. Keller, Structural Heart, was consulted for procedural evaluation of possible low flow moderate aortic stenosis.      PAST MEDICAL & SURGICAL HISTORY:  Atrial fibrillation, permanent: with slow ventricular response  LBBB (left bundle branch block)  DM (diabetes mellitus)  CAD (coronary artery disease)  CHF (congestive heart failure)  CVA (cerebral vascular accident)  S/P placement of cardiac pacemaker  S/P CABG (coronary artery bypass graft)      MEDICATIONS  (STANDING):  atorvastatin 40 milliGRAM(s) Oral at bedtime  digoxin     Tablet 0.125 milliGRAM(s) Oral daily  furosemide    Tablet 40 milliGRAM(s) Oral daily  heparin  Infusion.  Unit(s)/Hr (9.5 mL/Hr) IV Continuous <Continuous>  insulin lispro (HumaLOG) corrective regimen sliding scale   SubCutaneous Before meals and at bedtime  metoprolol succinate ER 50 milliGRAM(s) Oral daily  potassium chloride    Tablet ER 10 milliEquivalent(s) Oral daily    MEDICATIONS  (PRN):      Allergies    penicillin (Other)    Intolerances    SOCIAL HISTORY:  Smoker:  YES / NO        PACK YEARS:                         WHEN QUIT?  ETOH use:  YES / NO               FREQUENCY / QUANTITY:  Ilicit Drug use:  YES / NO  Occupation:  Assisted device use (Cane / Walker):  Live with:    FAMILY HISTORY:  No pertinent family history in first degree relatives    Review of Systems  CONSTITUTIONAL:  Denies Fevers / chills, sweats, fatigue, weight loss, weight gain                                      NEURO:  Denies paresthesia, seizures, syncope, confusion                                                                                EYES:  Denies Blurry vision, discharge, pain, loss of vision                                                                                    ENMT:  Denies Difficulty hearing, vertigo, dysphagia, epistaxis, recent dental work                                       CV:  Denies Chest pain, palpitations, DHILLON, orthopnea                                                                                          RESPIRATORY:  Denies Wheezing, SOB, cough / sputum, hemoptysis                                                                GI:  Denies Nausea, vomiting, diarrhea, constipation, melena, difficulty swallowing                                               : Denies Hematuria, dysuria, urgency, incontinence                                                                                         MUSCULOSKELETAL:  Denies arthritis, joint swelling, muscle weakness                                                             SKIN/BREAST:  Denies rash, itching, hair loss, masses                                                                                            PSYCH:  Denies depresion, anxiety, suicidal ideation                                                                                               HEME/LYMPH:  Denies bruises easily, enlarged lymph nodes, tender lymph nodes                                        ENDOCRINE:  Denies cold intolerance, heat intolerance, polydipsia                                                                    PHYSICAL EXAM  Vital Signs Last 24 Hrs  T(C): 36.2 (17 Aug 2018 14:39), Max: 36.8 (17 Aug 2018 07:01)  T(F): 97.2 (17 Aug 2018 14:39), Max: 98.2 (17 Aug 2018 07:01)  HR: 60 (17 Aug 2018 12:00) (59 - 77)  BP: 172/68 (17 Aug 2018 12:00) (149/80 - 179/84)  BP(mean): 107 (17 Aug 2018 12:00) (107 - 107)  RR: 16 (17 Aug 2018 12:00) (16 - 18)  SpO2: 95% (17 Aug 2018 12:00) (94% - 97%)    Appearance: No acute distress.  Neurologic: AAOx3, no MAS or focal deficits.  Responds appropriately to verbal and physical stimuli; exhibits purposeful movement in all extremities.  HEENT:   MMM, PERRLA, EOMI	b/l  Neck: Supple, + JVD/ - JVD; +/- Carotid Bruit   Cardiovascular: RRR, S1 S2. No m/r/g.  Respiratory: No acute respiratory distress. CTA b/l, no w/r/r.   Gastrointestinal:  Soft, non-tender, non-distended, + BS.	  Skin: No rashes. No ecchymoses. No cyanosis.  Extremities: Exhibits normal range of motion, no clubbing, cyanosis or edema.  Vascular: Peripheral pulses palpable 2+ bilaterally.                                                           LABS:                        13.1   7.5   )-----------( 197      ( 17 Aug 2018 10:33 )             42.3     08-17    134<L>  |  96  |  23  ----------------------------<  138<H>  4.6   |  25  |  1.18    Ca    9.8      17 Aug 2018 10:33  Mg     2.3     -    TPro  7.3  /  Alb  4.4  /  TBili  0.5  /  DBili  x   /  AST  33  /  ALT  19  /  AlkPhos  68  08-17    PT/INR - ( 17 Aug 2018 10:33 )   PT: 15.2 sec;   INR: 1.36          PTT - ( 17 Aug 2018 10:33 )  PTT:38.1 sec  Urinalysis Basic - ( 17 Aug 2018 04:10 )    Color: Yellow / Appearance: Clear / S.015 / pH: x  Gluc: x / Ketone: NEGATIVE  / Bili: Negative / Urobili: 0.2 E.U./dL   Blood: x / Protein: NEGATIVE mg/dL / Nitrite: NEGATIVE   Leuk Esterase: NEGATIVE / RBC: x / WBC x   Sq Epi: x / Non Sq Epi: x / Bacteria: x      CARDIAC MARKERS ( 17 Aug 2018 10:33 )  x     / 0.26 ng/mL / 208 U/L / x     / 24.4 ng/mL  CARDIAC MARKERS ( 17 Aug 2018 05:47 )  x     / 0.15 ng/mL / 218 U/L / x     / 21.7 ng/mL  CARDIAC MARKERS ( 17 Aug 2018 02:22 )  x     / 0.03 ng/mL / 151 U/L / x     / 9.1 ng/mL      Hemoglobin A1C, Whole Blood: 6.7 % ( @ 10:33)    Serum Pro-Brain Natriuretic Peptide: 1325 pg/mL (18 @ 02:22)      RADIOLOGY & ADDITIONAL STUDIES:  CAROTID U/S: pending    CXR:  < from: Xray Chest 2 Views PA/Lat (18 @ 02:08) >  EXAM:  XR CHEST PA LAT 2V                          PROCEDURE DATE:  2018        INTERPRETATION:  HISTORY: Chest pain    PA and lateral views of the chest are compared to prior radiographs of   2017.    There has been no change in cardiomegaly in this patient status post   sternotomy and CABG with left-sided transvenous pacing device. There is   no evidence of pulmonary vascular congestion, focal infiltrate, mass,   pleural fluid, or pneumothorax. There is unchanged tortuosity and   calcification of the aorta, convex right lower thoracic spinal curvature,   and exaggerated kyphosis.     IMPRESSION: Cardiomegaly. No evidence of congestive heart failure or   focal infiltrate. No significant change from 2018.      < end of copied text >    EKG:  < from: 12 Lead ECG (18 @ 07:47) >    Ventricular Rate 61 BPM    Atrial Rate 73 BPM    QRS Duration 194 ms    Q-T Interval 490 ms    QTC Calculation(Bezet) 493 ms    R Axis -84 degrees    T Axis 115 degrees    Diagnosis Line Ventricular pacemaker    Confirmed by TINO SINGH, SATDAYNA ([id number])on 2018 1:37:31 PM    < end of copied text >      TTE / KAYLI:    < from: TTE Echo w/Cont Complete (18 @ 11:38) >  Interpretation Summary  The left atrium is severely dilated. The left atrial volume index is 59.7   cc/m2   (normal <34cc/m2)  There is mild concentric left ventricular   hypertrophy.The   left ventricle is mildly dilated.The basal and mid inferolateral, basal   and   mid anterolateral walls are severely hypokinetic.  The left ventricular   ejection fraction is 40%.Right atrial size is normal.The right ventricle   is   dilated. The right ventricular systolic function is probably normal.   There is   a pacemaker wire in the right heart.  At least moderate aortic stenosis,   but   in light of low stroke volume this may represent low-flow, low-gradient   aortic   stenosis. The mean pressure gradient is 13 mmHg. The peak pressure   gradientis   24.7 mmHg. The dimensionless index (ratio of LVOTvelocity to aortic   velocity)   was calculated to be 0.27. The calculated stroke volume index is 26 cc/m2   (normal >35cc/m2).  The posterior mitral leaflet appears restricted.   Mitral   regurgitation jet is posteriorly directed. There is moderate mitral   regurgitation.  Other valves are normal. The pulmonary artery systolic   pressure is estimated to be at least 62 mmHg. There is severe pulmonary   hypertension.  No aortic root dilatation.Normal size aortic arch with no   hemodynamic evidence for coarctation.The IVC is dilated (>2.1 cm) with an   abnormal inspiratory collapse (<50%) consistent with elevated right   atrial   pressure.  A small pericardial effusion noted.Compared to echocardiogram   done   2017, there is now severe pulmonary hypertension and the ejection   fraction is greatly improved.    < end of copied text >    Cardiac Cath: see HPI Surgeon: Dr. Keller     Requesting Physician: Dr. Barrera     HISTORY OF PRESENT ILLNESS:  This is an 82 year old male with history of CVA with no residual deficits, dementia, chronic Afib, rate controlled, CAD s/p CABG, chronic HFrEF with EF 25% and severe AS with LAWRENCE 0.9cm2 on ECHO from 2017 who initially presented to [facility] ED on 18 with non-radiating substernal chest pain that lasts 30 minutes with spontaneous resolution.  In ED, he was noted to be hypertensive with /78mmHg, HR 77bpm, saturating well on RA, afebrile with troponin 0.03, BNP 1325, with BUN/Cr 26/1.39.  He was ASA and plavix loaded, started on heparin gtt and admitted to cardiology for further evaluation and management.  Repeat ECHO on admission demonstrated severely dilated LA, mild LVH, hypokinesis of basal and mid-inferolateral, and mid-anterolateral LV wall with EF 40% with at least moderate aortic stenosis with mean/peak gradients 13mmHg/24mmHg and moderate MR with PASP 62mmHg and small pericardial effusion with stroke volume 26cc/m2.  During his admission, serial troponins have continued to trend upward and on 18, he underwent diagnostic R/LHC which revealed Native 3VCAD: pRCA 50%, dRCA 100%, dLM 30%, pLCx 30%, pLAD 100%, pD1 50%, LIMA to LAD patent, SVG-RPDA patent, SVG-OM1 occluded, non-obstructive LCx, globally reduced EF 35-40%, EDP 21mmHg, Aortic valve 1.0, moderate AS, mean gradient 31mmHg; RHC: RA 2, RV 56/1, PA 56/13 mean 27, PCWP 20, PA sat 62%, CO 4.7, CI 2.4.  Dr. Keller, Structural Heart, was consulted for procedural evaluation of possible low flow moderate aortic stenosis.      PAST MEDICAL & SURGICAL HISTORY:  Atrial fibrillation, permanent: with slow ventricular response  LBBB (left bundle branch block)  DM (diabetes mellitus)  CAD (coronary artery disease)  CHF (congestive heart failure)  CVA (cerebral vascular accident)  S/P placement of cardiac pacemaker  S/P CABG (coronary artery bypass graft)      MEDICATIONS  (STANDING):  atorvastatin 40 milliGRAM(s) Oral at bedtime  digoxin     Tablet 0.125 milliGRAM(s) Oral daily  furosemide    Tablet 40 milliGRAM(s) Oral daily  heparin  Infusion.  Unit(s)/Hr (9.5 mL/Hr) IV Continuous <Continuous>  insulin lispro (HumaLOG) corrective regimen sliding scale   SubCutaneous Before meals and at bedtime  metoprolol succinate ER 50 milliGRAM(s) Oral daily  potassium chloride    Tablet ER 10 milliEquivalent(s) Oral daily    MEDICATIONS  (PRN):      Allergies    penicillin (Other)    Intolerances    SOCIAL HISTORY:  Smoker:  NO    ETOH use: NO    Ilicit Drug use:  NO  Occupation: Retired   Live with: Daughter     FAMILY HISTORY:  No pertinent family history in first degree relatives    Review of Systems- Unable to obtain 2/2 patient and family request.                                                               PHYSICAL EXAM- Unable to conduct physical exam per patient and family request.   Vital Signs Last 24 Hrs  T(C): 36.2 (17 Aug 2018 14:39), Max: 36.8 (17 Aug 2018 07:01)  T(F): 97.2 (17 Aug 2018 14:39), Max: 98.2 (17 Aug 2018 07:01)  HR: 60 (17 Aug 2018 12:00) (59 - 77)  BP: 172/68 (17 Aug 2018 12:00) (149/80 - 179/84)  BP(mean): 107 (17 Aug 2018 12:00) (107 - 107)  RR: 16 (17 Aug 2018 12:00) (16 - 18)  SpO2: 95% (17 Aug 2018 12:00) (94% - 97%)    LABS:                        13.1   7.5   )-----------( 197      ( 17 Aug 2018 10:33 )             42.3         134<L>  |  96  |  23  ----------------------------<  138<H>  4.6   |  25  |  1.18    Ca    9.8      17 Aug 2018 10:33  Mg     2.3         TPro  7.3  /  Alb  4.4  /  TBili  0.5  /  DBili  x   /  AST  33  /  ALT  19  /  AlkPhos  68  0817    PT/INR - ( 17 Aug 2018 10:33 )   PT: 15.2 sec;   INR: 1.36          PTT - ( 17 Aug 2018 10:33 )  PTT:38.1 sec  Urinalysis Basic - ( 17 Aug 2018 04:10 )    Color: Yellow / Appearance: Clear / S.015 / pH: x  Gluc: x / Ketone: NEGATIVE  / Bili: Negative / Urobili: 0.2 E.U./dL   Blood: x / Protein: NEGATIVE mg/dL / Nitrite: NEGATIVE   Leuk Esterase: NEGATIVE / RBC: x / WBC x   Sq Epi: x / Non Sq Epi: x / Bacteria: x      CARDIAC MARKERS ( 17 Aug 2018 10:33 )  x     / 0.26 ng/mL / 208 U/L / x     / 24.4 ng/mL  CARDIAC MARKERS ( 17 Aug 2018 05:47 )  x     / 0.15 ng/mL / 218 U/L / x     / 21.7 ng/mL  CARDIAC MARKERS ( 17 Aug 2018 02:22 )  x     / 0.03 ng/mL / 151 U/L / x     / 9.1 ng/mL      Hemoglobin A1C, Whole Blood: 6.7 % ( @ 10:33)    Serum Pro-Brain Natriuretic Peptide: 1325 pg/mL (18 @ 02:22)      RADIOLOGY & ADDITIONAL STUDIES:  CAROTID U/S: pending    CXR:  < from: Xray Chest 2 Views PA/Lat (18 @ 02:08) >  EXAM:  XR CHEST PA LAT 2V                          PROCEDURE DATE:  2018        INTERPRETATION:  HISTORY: Chest pain    PA and lateral views of the chest are compared to prior radiographs of   2017.    There has been no change in cardiomegaly in this patient status post   sternotomy and CABG with left-sided transvenous pacing device. There is   no evidence of pulmonary vascular congestion, focal infiltrate, mass,   pleural fluid, or pneumothorax. There is unchanged tortuosity and   calcification of the aorta, convex right lower thoracic spinal curvature,   and exaggerated kyphosis.     IMPRESSION: Cardiomegaly. No evidence of congestive heart failure or   focal infiltrate. No significant change from 2018.      < end of copied text >    EKG:  < from: 12 Lead ECG (18 @ 07:47) >    Ventricular Rate 61 BPM    Atrial Rate 73 BPM    QRS Duration 194 ms    Q-T Interval 490 ms    QTC Calculation(Bezet) 493 ms    R Axis -84 degrees    T Axis 115 degrees    Diagnosis Line Ventricular pacemaker    Confirmed by ARNOLD JIMÉNEZ MD ([id number])on 2018 1:37:31 PM    < end of copied text >      TTE / KAYLI:    < from: TTE Echo w/Cont Complete (18 @ 11:38) >  Interpretation Summary  The left atrium is severely dilated. The left atrial volume index is 59.7   cc/m2   (normal <34cc/m2)  There is mild concentric left ventricular   hypertrophy.The   left ventricle is mildly dilated.The basal and mid inferolateral, basal   and   mid anterolateral walls are severely hypokinetic.  The left ventricular   ejection fraction is 40%.Right atrial size is normal.The right ventricle   is   dilated. The right ventricular systolic function is probably normal.   There is   a pacemaker wire in the right heart.  At least moderate aortic stenosis,   but   in light of low stroke volume this may represent low-flow, low-gradient   aortic   stenosis. The mean pressure gradient is 13 mmHg. The peak pressure   gradientis   24.7 mmHg. The dimensionless index (ratio of LVOTvelocity to aortic   velocity)   was calculated to be 0.27. The calculated stroke volume index is 26 cc/m2   (normal >35cc/m2).  The posterior mitral leaflet appears restricted.   Mitral   regurgitation jet is posteriorly directed. There is moderate mitral   regurgitation.  Other valves are normal. The pulmonary artery systolic   pressure is estimated to be at least 62 mmHg. There is severe pulmonary   hypertension.  No aortic root dilatation.Normal size aortic arch with no   hemodynamic evidence for coarctation.The IVC is dilated (>2.1 cm) with an   abnormal inspiratory collapse (<50%) consistent with elevated right   atrial   pressure.  A small pericardial effusion noted.Compared to echocardiogram   done   2017, there is now severe pulmonary hypertension and the ejection   fraction is greatly improved.    < end of copied text >    Cardiac Cath: see HPI

## 2018-08-17 NOTE — H&P ADULT - PROBLEM SELECTOR PLAN 3
HX Afib s/p PPM s/p Biv ICD upgrade  - s/p interrogation- no arrhythmias notes   - c/w Heparin gtt (on Eliquis 5mg BID)  - c/w Digoxin 0.125mg (dig level 0.6)  - c/w Toprol XL 50mg

## 2018-08-17 NOTE — ED PROVIDER NOTE - MEDICAL DECISION MAKING DETAILS
known cardiac disease and interventions in past + CP tonight + labs/ ekg and cxr with cardiac care inpatient discussed known cardiac disease and interventions in past + CP tonight + labs/ ekg and cxr with cardiac care inpatient discussed and eventually allowed at second troponin with increased value from first + PPM interrogated and patient seen by card fellow  ( patient remains pain free during time in ED )

## 2018-08-17 NOTE — ED PROVIDER NOTE - PHYSICAL EXAMINATION
chronic le trophic skin changes with non pitting edema noted chronic le trophic skin changes with non pitting edema noted, alert oriented and conversant with daughter assisting with translation

## 2018-08-17 NOTE — ED PROVIDER NOTE - CARE PLAN
Principal Discharge DX:	CAD (coronary artery disease)  Secondary Diagnosis:	CHF (congestive heart failure)

## 2018-08-17 NOTE — ED PROVIDER NOTE - PROGRESS NOTE DETAILS
cardiac fellow in ED with patient and daughter not allowing admission until second trop notesd increased values and now @ 640 am allows admission -> discussed with cards fellow + asa given ( digoxin now mentioned by daughter as med and such level ordered )

## 2018-08-17 NOTE — H&P ADULT - PROBLEM SELECTOR PLAN 6
HX CVA, no focal deficits.   - c/w IV Heparin gtt (On eliquis 5mg BID HD)  - monitor changes mental status

## 2018-08-17 NOTE — ED ADULT TRIAGE NOTE - AS O2 DELIVERY
Pt back from [facility], [address] [place] [address]  12/03/17 8335 room air

## 2018-08-17 NOTE — ED PROVIDER NOTE - OBJECTIVE STATEMENT
HX DM CAD CABG  PPM awoke tonight with CP / diaphoresis and thus to ED for care HX DM CAD CABG  PPM, dementia/ mental decline   awoke tonight with CP / diaphoresis and thus to ED for care on Eliquis not asa , Digoxin , lasix ( metoprolol, and Valsartan ( changed to ? ) No CP on arrival to ED History primarily provided by daughter both translating and primary caretaker

## 2018-08-17 NOTE — H&P ADULT - PROBLEM SELECTOR PLAN 2
Known hx AS (0.9 cm).  - ECHO performed, f/u results  - c/w Lasix 40mg QD  - HOLD Valsartan for possible cardiac cath   - strict I/O's, daily weights

## 2018-08-18 ENCOUNTER — TRANSCRIPTION ENCOUNTER (OUTPATIENT)
Age: 83
End: 2018-08-18

## 2018-08-18 VITALS — TEMPERATURE: 98 F

## 2018-08-18 LAB
ANION GAP SERPL CALC-SCNC: 13 MMOL/L — SIGNIFICANT CHANGE UP (ref 5–17)
APTT BLD: 96.6 SEC — HIGH (ref 27.5–37.4)
BUN SERPL-MCNC: 24 MG/DL — HIGH (ref 7–23)
CALCIUM SERPL-MCNC: 9.6 MG/DL — SIGNIFICANT CHANGE UP (ref 8.4–10.5)
CHLORIDE SERPL-SCNC: 100 MMOL/L — SIGNIFICANT CHANGE UP (ref 96–108)
CO2 SERPL-SCNC: 28 MMOL/L — SIGNIFICANT CHANGE UP (ref 22–31)
CREAT SERPL-MCNC: 1.28 MG/DL — SIGNIFICANT CHANGE UP (ref 0.5–1.3)
GLUCOSE BLDC GLUCOMTR-MCNC: 123 MG/DL — HIGH (ref 70–99)
GLUCOSE BLDC GLUCOMTR-MCNC: 200 MG/DL — HIGH (ref 70–99)
GLUCOSE SERPL-MCNC: 121 MG/DL — HIGH (ref 70–99)
HCT VFR BLD CALC: 41.8 % — SIGNIFICANT CHANGE UP (ref 39–50)
HGB BLD-MCNC: 13.5 G/DL — SIGNIFICANT CHANGE UP (ref 13–17)
INR BLD: 1.32 — HIGH (ref 0.88–1.16)
MAGNESIUM SERPL-MCNC: 2.5 MG/DL — SIGNIFICANT CHANGE UP (ref 1.6–2.6)
MCHC RBC-ENTMCNC: 29.9 PG — SIGNIFICANT CHANGE UP (ref 27–34)
MCHC RBC-ENTMCNC: 32.3 G/DL — SIGNIFICANT CHANGE UP (ref 32–36)
MCV RBC AUTO: 92.5 FL — SIGNIFICANT CHANGE UP (ref 80–100)
PLATELET # BLD AUTO: 171 K/UL — SIGNIFICANT CHANGE UP (ref 150–400)
POTASSIUM SERPL-MCNC: 4.1 MMOL/L — SIGNIFICANT CHANGE UP (ref 3.5–5.3)
POTASSIUM SERPL-SCNC: 4.1 MMOL/L — SIGNIFICANT CHANGE UP (ref 3.5–5.3)
PROTHROM AB SERPL-ACNC: 14.7 SEC — HIGH (ref 9.8–12.7)
RBC # BLD: 4.52 M/UL — SIGNIFICANT CHANGE UP (ref 4.2–5.8)
RBC # FLD: 13.1 % — SIGNIFICANT CHANGE UP (ref 10.3–16.9)
SODIUM SERPL-SCNC: 141 MMOL/L — SIGNIFICANT CHANGE UP (ref 135–145)
WBC # BLD: 7.6 K/UL — SIGNIFICANT CHANGE UP (ref 3.8–10.5)
WBC # FLD AUTO: 7.6 K/UL — SIGNIFICANT CHANGE UP (ref 3.8–10.5)

## 2018-08-18 PROCEDURE — 99239 HOSP IP/OBS DSCHRG MGMT >30: CPT

## 2018-08-18 RX ORDER — HEPARIN SODIUM 5000 [USP'U]/ML
1300 INJECTION INTRAVENOUS; SUBCUTANEOUS
Qty: 25000 | Refills: 0 | Status: DISCONTINUED | OUTPATIENT
Start: 2018-08-18 | End: 2018-08-18

## 2018-08-18 RX ORDER — ASPIRIN/CALCIUM CARB/MAGNESIUM 324 MG
81 TABLET ORAL DAILY
Qty: 0 | Refills: 0 | Status: DISCONTINUED | OUTPATIENT
Start: 2018-08-18 | End: 2018-08-18

## 2018-08-18 RX ORDER — APIXABAN 2.5 MG/1
5 TABLET, FILM COATED ORAL
Qty: 0 | Refills: 0 | Status: DISCONTINUED | OUTPATIENT
Start: 2018-08-18 | End: 2018-08-18

## 2018-08-18 RX ORDER — HEPARIN SODIUM 5000 [USP'U]/ML
1200 INJECTION INTRAVENOUS; SUBCUTANEOUS
Qty: 25000 | Refills: 0 | Status: DISCONTINUED | OUTPATIENT
Start: 2018-08-18 | End: 2018-08-18

## 2018-08-18 RX ORDER — FUROSEMIDE 40 MG
40 TABLET ORAL
Qty: 0 | Refills: 0 | COMMUNITY

## 2018-08-18 RX ORDER — CLOPIDOGREL BISULFATE 75 MG/1
1 TABLET, FILM COATED ORAL
Qty: 30 | Refills: 0 | OUTPATIENT
Start: 2018-08-18 | End: 2018-09-16

## 2018-08-18 RX ORDER — CLOPIDOGREL BISULFATE 75 MG/1
75 TABLET, FILM COATED ORAL DAILY
Qty: 0 | Refills: 0 | Status: DISCONTINUED | OUTPATIENT
Start: 2018-08-18 | End: 2018-08-18

## 2018-08-18 RX ORDER — ASPIRIN/CALCIUM CARB/MAGNESIUM 324 MG
1 TABLET ORAL
Qty: 30 | Refills: 0 | OUTPATIENT
Start: 2018-08-18 | End: 2018-09-16

## 2018-08-18 RX ADMIN — Medication 10 MILLIEQUIVALENT(S): at 11:48

## 2018-08-18 RX ADMIN — Medication 1: at 11:48

## 2018-08-18 RX ADMIN — APIXABAN 5 MILLIGRAM(S): 2.5 TABLET, FILM COATED ORAL at 12:43

## 2018-08-18 RX ADMIN — HEPARIN SODIUM 12 UNIT(S)/HR: 5000 INJECTION INTRAVENOUS; SUBCUTANEOUS at 07:50

## 2018-08-18 RX ADMIN — Medication 0.12 MILLIGRAM(S): at 06:53

## 2018-08-18 RX ADMIN — HEPARIN SODIUM 13 UNIT(S)/HR: 5000 INJECTION INTRAVENOUS; SUBCUTANEOUS at 01:27

## 2018-08-18 RX ADMIN — Medication 40 MILLIGRAM(S): at 06:53

## 2018-08-18 RX ADMIN — Medication 50 MILLIGRAM(S): at 06:53

## 2018-08-18 RX ADMIN — CLOPIDOGREL BISULFATE 75 MILLIGRAM(S): 75 TABLET, FILM COATED ORAL at 11:48

## 2018-08-18 NOTE — DISCHARGE NOTE ADULT - CARE PLAN
Principal Discharge DX:	CAD (coronary artery disease)  Secondary Diagnosis:	Aortic stenosis Principal Discharge DX:	NSTEMI (non-ST elevated myocardial infarction)  Secondary Diagnosis:	Aortic stenosis Principal Discharge DX:	NSTEMI (non-ST elevated myocardial infarction)  Goal:	Follow up with cardiologist in 1 week.  Assessment and plan of treatment:	You came into the hospital with chest pain and was found to have a heart attack with abnormal blood work (cardiac enzymes). You underwent a cardiac catheterization which showed 2 patent or open cardiac grafts, and 1 graft was occluded. You were started on blood thinner medication Plavix 75mg daily, in addition to your home medications.  - DO NOT do any heavy lifting or strenuous activity for 1 week.  - Monitor the right groin access site for any bleeding, redness, swelling, increased pain, fever, chills.   - You MAY shower BUT no TUB BATHS, HOT TUBS OR SWIMMING FOR 5 DAYS  - Your procedure was done through your right groin. If you observe flank bleeding from the puncture site, it is an emergency. Please put direct pressure on the site and go directly to the ER. Bleeding under the skin may also occur and a small "black and blue" may be expected. If the area appears to be expanding or swelling around the puncture site, apply manual compression and go immediately to the nearest ER. If your foot/leg becomes cool or blue and/or you are unable to move it, this must be treated as an emergency, go directly to the nearest ER. Look for signs of infection in the groin: fever, chills, red streaking of the leg, obvious pus formation and increased pain.  -Call Dr Alvarez if you have any concerns regarding the site. Please follow up with Dr Alvarez in 1 week.  Secondary Diagnosis:	Aortic stenosis  Assessment and plan of treatment:	You had an echocardiogram and cardiac catheterization performed that showed your aortic valve in the heart has moderate stenosis, which means the aortic valve is tight. At this time the aortic valve does not need repair, but it should be continued to be monitored by your cardiologist.

## 2018-08-18 NOTE — DISCHARGE NOTE ADULT - PROVIDER TOKENS
FREE:[LAST:[Kim],FIRST:[Jean],PHONE:[phone]],FAX:[(   [phone]    -],ADDRESS:[[address] [place] [address], [address] [place] [address]]],TOKEN:'[id number]'

## 2018-08-18 NOTE — DISCHARGE NOTE ADULT - PATIENT PORTAL LINK FT
You can access the [email or web address] Patient Portal, offered by [facility], by registering with the following website: [email or web address]

## 2018-08-18 NOTE — PHYSICAL THERAPY INITIAL EVALUATION ADULT - GENERAL OBSERVATIONS, REHAB EVAL
Patient received standing in doorway with daughter (+) EKG (+) heplock (+) right groin dressing no apparent distress

## 2018-08-18 NOTE — DISCHARGE NOTE ADULT - PLAN OF CARE
Follow up with cardiologist in 1 week. You came into the hospital with chest pain and was found to have a heart attack with abnormal blood work (cardiac enzymes). You underwent a cardiac catheterization which showed 2 patent or open cardiac grafts, and 1 graft was occluded. You were started on blood thinner medication Plavix 75mg daily, in addition to your home medications.  - DO NOT do any heavy lifting or strenuous activity for 1 week.  - Monitor the right groin access site for any bleeding, redness, swelling, increased pain, fever, chills.   - You MAY shower BUT no TUB BATHS, HOT TUBS OR SWIMMING FOR 5 DAYS  - Your procedure was done through your right groin. If you observe flank bleeding from the puncture site, it is an emergency. Please put direct pressure on the site and go directly to the ER. Bleeding under the skin may also occur and a small "black and blue" may be expected. If the area appears to be expanding or swelling around the puncture site, apply manual compression and go immediately to the nearest ER. If your foot/leg becomes cool or blue and/or you are unable to move it, this must be treated as an emergency, go directly to the nearest ER. Look for signs of infection in the groin: fever, chills, red streaking of the leg, obvious pus formation and increased pain.  -Call Dr Alvarez if you have any concerns regarding the site. Please follow up with Dr Alvarez in 1 week. You had an echocardiogram and cardiac catheterization performed that showed your aortic valve in the heart has moderate stenosis, which means the aortic valve is tight. At this time the aortic valve does not need repair, but it should be continued to be monitored by your cardiologist.

## 2018-08-18 NOTE — DISCHARGE NOTE ADULT - CARE PROVIDER_API CALL
Jean Alvarez  [address] [place] [id number]  Phone: [phone]  Fax: (   )    -    Nate Keller), Cardiology; Interventional Cardiology  [address]  [address] [place] [address]  Phone: [phone]  Fax: [phone]

## 2018-08-18 NOTE — PHYSICAL THERAPY INITIAL EVALUATION ADULT - ADDITIONAL COMMENTS
Patient lives with daughter no steps upon entry no use of DME, no reported falls, always attended by daughter when they go outside of the house 3x a day to walk dog, patient requires assist for bathing and dressing.

## 2018-08-18 NOTE — DISCHARGE NOTE ADULT - HOSPITAL COURSE
82 y/oM PMHx permanent Afib with slow VR s/p single chamber PPM s/p upgrade BiV ICD (7/2017), CVA, HTN, CAD s/p CABG, Systolic Heart Failure (EF 25% 2017) and dementia presented 8/17 with CP.  Patient lives at home with daughter who is his primary care taker and expressed chest pain to her while at home, which prompted her to being him to the emergency room. Patient poor historian as has baseline dementia. Chest pain non-radiating only lasting about 30 minutes as per daughter without other noted associated sx of SOB, dizziness/diaphoresis, n/v, palpitations.   In ED, VS: T 97.7 HR 77 /78 RR 18 SpO2 96%RA. EKG V-Paced @ 61BPM. CXR without infiltrates or effusions. Labs notable for Trop 0.03, BNP 1325, BUN/Creat 26/1.39. Given ASA 325mg, Plavix 300 mg and started on IV Heparin gtt. Patient admitted to tele [facility] for further management. 82 y/oM PMHx permanent Afib with slow VR s/p single chamber PPM s/p upgrade BiV ICD (7/2017), CVA, HTN, CAD s/p CABG, Systolic Heart Failure (EF 25% 2017) and dementia presented 8/17 with CP.  Patient lives at home with daughter who is his primary care taker and expressed chest pain to her while at home, which prompted her to being him to the emergency room. Patient poor historian as has baseline dementia. Chest pain non-radiating only lasting about 30 minutes as per daughter without other noted associated sx of SOB, dizziness/diaphoresis, n/v, palpitations. In ED, VS: T 97.7 HR 77 /78 RR 18 SpO2 96%RA. EKG V-Paced @ 61BPM. CXR without infiltrates or effusions. Labs notable for Trop 0.03, BNP 1325, BUN/Creat 26/1.39. Given ASA 325mg, Plavix 300 mg and started on IV Heparin gtt. Patient admitted to tele 5 [facility] for further management.    ECHO performed w/ EF 40%, mild conc. LVH, LV mildly dilated, basal and mid inferolateral, basal and mid anterolateral walls are severely hypokinetic, moderate AS, severe pulmonary. hypertension 83y/o M poor historian w/ PMHx permanent Afib with slow VR s/p single chamber PPM s/p upgrade BiV ICD (7/2017), CVA, HTN, CAD s/p CABG, Systolic Heart Failure (EF 25% 2017) and dementia presented w/ daughter who is his primary care taker c/o non-radiating chest pain x 30 minutes. In ED, VS: T 97.7 HR 77 /78 RR 18 SpO2 96%RA. EKG V-Paced @ 61BPM. CXR without infiltrates or effusions. Labs notable for Trop 0.03 ->0.26 peaked, BNP 1325, BUN/Creat 26/1.39. Given ASA/Plavix load and started on IV Heparin gtt. Patient admitted to tele 5 [facility] for NSTEMI. ECHO performed w/ EF 40%, mild conc. LVH, LV mildly dilated, basal and mid inferolateral, basal and mid anterolateral walls are severely hypokinetic, moderate AS, severe pulmonary HTN. Pt underwent Right and Left Cardiac Cath 8/17/18: Native 3VCAD: proxRCA 50%, distal %, distal LM 30%, proxLCx 30%, proxLAD 100%, proxD1 50%, LIMA to LAD patent, SVG-RPDA patent, SVG-OM1 occluded, non-obstructive LCx, globally reduced EF 35-40%, EDP 21mmHg, Ao valve 1.0, mean gradient 31mmHg; RHC: RA 2, RV 56/1, PA 56/13 mean 27, PCWP 20, PA sat 62%, CO 4.7, CI 2.4. Recommended medical management for CAD and moderate AS. Right groin Perclose site w/ small hematoma that resolved s/p manual compression. CT Surgery consulted and noting moderate AS on echo and cath, pt to follow up w/ outpt cardiologist for further monitoring. Pt remains hemodynamically stable for discharge home. PT eval 81y/o M poor historian w/ PMHx permanent Afib with slow VR s/p single chamber PPM s/p upgrade BiV ICD (7/2017), CVA, HTN, CAD s/p CABG, Systolic Heart Failure (EF 25% 2017) and dementia presented w/ daughter who is his primary care taker c/o non-radiating chest pain x 30 minutes. In ED, VS: T 97.7 HR 77 /78 RR 18 SpO2 96%RA. EKG V-Paced @ 61BPM. CXR without infiltrates or effusions. Labs notable for Trop 0.03 ->0.26 peaked, BNP 1325, BUN/Creat 26/1.39. Given ASA/Plavix load and started on IV Heparin gtt. Patient admitted to tele 5 [facility] for NSTEMI. ECHO performed w/ EF 40%, mild conc. LVH, LV mildly dilated, basal and mid inferolateral, basal and mid anterolateral walls are severely hypokinetic, moderate AS, severe pulmonary HTN. Pt underwent Right and Left Cardiac Cath 8/17/18: Native 3VCAD: proxRCA 50%, distal %, distal LM 30%, proxLCx 30%, proxLAD 100%, proxD1 50%, LIMA to LAD patent, SVG-RPDA patent, SVG-OM1 occluded, non-obstructive LCx, globally reduced EF 35-40%, EDP 21mmHg, Ao valve 1.0, mean gradient 31mmHg; RHC: RA 2, RV 56/1, PA 56/13 mean 27, PCWP 20, PA sat 62%, CO 4.7, CI 2.4. Recommended medical management for CAD and moderate AS. Right groin Perclose site w/ small hematoma that resolved s/p manual compression. CT Surgery consulted and noting moderate AS on echo and cath, pt to follow up w/ outpt cardiologist for further monitoring. Pt to be d/c on Plavix, Losartan, Toprol, Lipitor. Pt remains hemodynamically stable for discharge home. PT eval 81y/o M poor historian w/ PMHx permanent Afib with slow VR s/p single chamber PPM s/p upgrade BiV ICD (7/2017), CVA, HTN, CAD s/p CABG, Systolic Heart Failure (EF 25% 2017) and dementia presented w/ daughter who is his primary care taker c/o non-radiating chest pain x 30 minutes. In ED, VS: T 97.7 HR 77 /78 RR 18 SpO2 96%RA. EKG V-Paced @ 61BPM. CXR without infiltrates or effusions. Labs notable for Trop 0.03 ->0.26 peaked, BNP 1325, BUN/Creat 26/1.39. Given ASA/Plavix load and started on IV Heparin gtt. Patient admitted to tele 5 [facility] for NSTEMI. ECHO performed w/ EF 40%, mild conc. LVH, LV mildly dilated, basal and mid inferolateral, basal and mid anterolateral walls are severely hypokinetic, moderate AS, severe pulmonary HTN. Pt underwent Right and Left Cardiac Cath 8/17/18: Native 3VCAD: proxRCA 50%, distal %, distal LM 30%, proxLCx 30%, proxLAD 100%, proxD1 50%, LIMA to LAD patent, SVG-RPDA patent, SVG-OM1 occluded, non-obstructive LCx, globally reduced EF 35-40%, EDP 21mmHg, Ao valve 1.0, mean gradient 31mmHg; RHC: RA 2, RV 56/1, PA 56/13 mean 27, PCWP 20, PA sat 62%, CO 4.7, CI 2.4. Recommended medical management for CAD and moderate AS. Right groin Perclose site w/ small hematoma that resolved s/p manual compression. CT Surgery consulted and noting moderate AS on echo and cath, pt to follow up w/ outpt cardiologist for further monitoring. Pt to be d/c on Plavix, Losartan, Toprol, Lipitor. Pt remains hemodynamically stable for discharge home w/ daughter. PT eval rec home PT, set up via case management.

## 2018-08-18 NOTE — DISCHARGE NOTE ADULT - MEDICATION SUMMARY - MEDICATIONS TO TAKE
I will START or STAY ON the medications listed below when I get home from the hospital:    losartan 25 mg oral tablet  -- 1 tab(s) by mouth once a day  -- Indication: For CHF (congestive heart failure)    Digox 125 mcg (0.125 mg) oral tablet  -- 1 tab(s) by mouth once a day  -- Indication: For Atrial fibrillation, permanent    Eliquis 5 mg oral tablet  -- 1 tab(s) by mouth 2 times a day  -- Indication: For Atrial fibrillation, permanent    metFORMIN 500 mg oral tablet  -- 1 tab(s) by mouth 2 times a day  -- Indication: For DM (diabetes mellitus)    atorvastatin 40 mg oral tablet  -- 1 tab(s) by mouth once a day  -- Indication: For CAD(CORONARY ARTERY DISEASE)/NSTEMI    clopidogrel 75 mg oral tablet  -- 1 tab(s) by mouth once a day  -- Indication: For CAD(CORONARY ARTERY DISEASE)/NSTEMI    metoprolol succinate 50 mg oral tablet, extended release  -- 1 tab(s) by mouth once a day  -- Indication: For CAD(CORONARY ARTERY DISEASE)/NSTEMI/CHF    furosemide  -- 40 milligram(s) by mouth once a day  -- Indication: For CHF (congestive heart failure)    K-Dur 10  -- 10 milliequivalent(s) by mouth once a day  -- Indication: For supplement I will START or STAY ON the medications listed below when I get home from the hospital:    losartan 25 mg oral tablet  -- 1 tab(s) by mouth once a day  -- Indication: For CHF (congestive heart failure)    Digox 125 mcg (0.125 mg) oral tablet  -- 1 tab(s) by mouth once a day  -- Indication: For Atrial fibrillation, permanent    Eliquis 5 mg oral tablet  -- 1 tab(s) by mouth 2 times a day  -- Indication: For Atrial fibrillation, permanent    metFORMIN 500 mg oral tablet  -- 1 tab(s) by mouth 2 times a day  -- Indication: For DM (diabetes mellitus)    atorvastatin 40 mg oral tablet  -- 1 tab(s) by mouth once a day  -- Indication: For NSTEMI (non-ST elevated myocardial infarction)    clopidogrel 75 mg oral tablet  -- 1 tab(s) by mouth once a day  -- Indication: For NSTEMI (non-ST elevated myocardial infarction)    metoprolol succinate 50 mg oral tablet, extended release  -- 1 tab(s) by mouth once a day  -- Indication: For NSTEMI (non-ST elevated myocardial infarction)/Congestive Heart Failure    furosemide  -- 40 milligram(s) by mouth once a day  -- Indication: For CHF (congestive heart failure)    K-Dur 10  -- 10 milliequivalent(s) by mouth once a day  -- Indication: For supplement

## 2018-08-18 NOTE — DISCHARGE NOTE ADULT - ADDITIONAL INSTRUCTIONS
1. Please follow up with your cardiologist Dr Alvarez in 1-2 weeks. Please call the office to make an appointment.  Jean Alvarez  [facility], [address] [place] [address]  Phone: [phone]    2. Please follow up with Structural Heart Specialist Dr Keller for your aortic valve as needed per your cardiologist.  Nate Keller), Cardiology; Interventional Cardiology  [address]  [address] [place] [address]  Phone: [phone] 1. Please follow up with your cardiologist Dr Alvarez in 1-2 weeks. Please call the office to make an appointment.  Jean Alvarez  [address] [facility], [address] [place] [address]  Phone: [phone]    **You can have Dr Alvarez's office call the cardiac cath lab next week to request a copy of the cath report [phone]**  2. Please follow up with Structural Heart Specialist Dr Keller for your aortic valve as needed per your cardiologist.  Nate Keller), Cardiology; Interventional Cardiology  [address]  [address] [place] [address]  Phone: [phone]

## 2018-08-18 NOTE — PHYSICAL THERAPY INITIAL EVALUATION ADULT - CRITERIA FOR SKILLED THERAPEUTIC INTERVENTIONS
risk reduction/prevention/rehab potential/functional limitations in following categories/impairments found/anticipated discharge recommendation

## 2018-10-25 PROCEDURE — 82962 GLUCOSE BLOOD TEST: CPT

## 2018-10-25 PROCEDURE — C1889: CPT

## 2018-10-25 PROCEDURE — 93005 ELECTROCARDIOGRAM TRACING: CPT

## 2018-10-25 PROCEDURE — 83036 HEMOGLOBIN GLYCOSYLATED A1C: CPT

## 2018-10-25 PROCEDURE — C1769: CPT

## 2018-10-25 PROCEDURE — 82550 ASSAY OF CK (CPK): CPT

## 2018-10-25 PROCEDURE — 80162 ASSAY OF DIGOXIN TOTAL: CPT

## 2018-10-25 PROCEDURE — 80061 LIPID PANEL: CPT

## 2018-10-25 PROCEDURE — C1887: CPT

## 2018-10-25 PROCEDURE — 97161 PT EVAL LOW COMPLEX 20 MIN: CPT

## 2018-10-25 PROCEDURE — 71046 X-RAY EXAM CHEST 2 VIEWS: CPT

## 2018-10-25 PROCEDURE — C1760: CPT

## 2018-10-25 PROCEDURE — 80053 COMPREHEN METABOLIC PANEL: CPT

## 2018-10-25 PROCEDURE — 85610 PROTHROMBIN TIME: CPT

## 2018-10-25 PROCEDURE — 36415 COLL VENOUS BLD VENIPUNCTURE: CPT

## 2018-10-25 PROCEDURE — 84484 ASSAY OF TROPONIN QUANT: CPT

## 2018-10-25 PROCEDURE — 85027 COMPLETE CBC AUTOMATED: CPT

## 2018-10-25 PROCEDURE — C1766: CPT

## 2018-10-25 PROCEDURE — 83880 ASSAY OF NATRIURETIC PEPTIDE: CPT

## 2018-10-25 PROCEDURE — 80048 BASIC METABOLIC PNL TOTAL CA: CPT

## 2018-10-25 PROCEDURE — C1894: CPT

## 2018-10-25 PROCEDURE — 81003 URINALYSIS AUTO W/O SCOPE: CPT

## 2018-10-25 PROCEDURE — 83690 ASSAY OF LIPASE: CPT

## 2018-10-25 PROCEDURE — 82553 CREATINE MB FRACTION: CPT

## 2018-10-25 PROCEDURE — 85730 THROMBOPLASTIN TIME PARTIAL: CPT

## 2018-10-25 PROCEDURE — 85025 COMPLETE CBC W/AUTO DIFF WBC: CPT

## 2018-10-25 PROCEDURE — 99285 EMERGENCY DEPT VISIT HI MDM: CPT | Mod: 25

## 2018-10-25 PROCEDURE — C8929: CPT

## 2018-10-25 PROCEDURE — 83735 ASSAY OF MAGNESIUM: CPT

## 2019-03-25 ENCOUNTER — RX RENEWAL (OUTPATIENT)
Age: 84
End: 2019-03-25

## 2019-04-30 ENCOUNTER — EMERGENCY (EMERGENCY)
Facility: HOSPITAL | Age: 84
LOS: 1 days | Discharge: HOME | End: 2019-04-30
Attending: EMERGENCY MEDICINE | Admitting: EMERGENCY MEDICINE
Payer: MEDICARE

## 2019-04-30 VITALS
SYSTOLIC BLOOD PRESSURE: 124 MMHG | HEART RATE: 69 BPM | RESPIRATION RATE: 18 BRPM | DIASTOLIC BLOOD PRESSURE: 63 MMHG | OXYGEN SATURATION: 98 % | TEMPERATURE: 98 F

## 2019-04-30 VITALS
TEMPERATURE: 98 F | RESPIRATION RATE: 18 BRPM | WEIGHT: 182.98 LBS | OXYGEN SATURATION: 98 % | HEART RATE: 71 BPM | SYSTOLIC BLOOD PRESSURE: 145 MMHG | DIASTOLIC BLOOD PRESSURE: 68 MMHG | HEIGHT: 68 IN

## 2019-04-30 DIAGNOSIS — I50.9 HEART FAILURE, UNSPECIFIED: ICD-10-CM

## 2019-04-30 DIAGNOSIS — Z79.899 OTHER LONG TERM (CURRENT) DRUG THERAPY: ICD-10-CM

## 2019-04-30 DIAGNOSIS — I25.10 ATHEROSCLEROTIC HEART DISEASE OF NATIVE CORONARY ARTERY WITHOUT ANGINA PECTORIS: ICD-10-CM

## 2019-04-30 DIAGNOSIS — Z79.84 LONG TERM (CURRENT) USE OF ORAL HYPOGLYCEMIC DRUGS: ICD-10-CM

## 2019-04-30 DIAGNOSIS — K62.5 HEMORRHAGE OF ANUS AND RECTUM: ICD-10-CM

## 2019-04-30 DIAGNOSIS — Z95.1 PRESENCE OF AORTOCORONARY BYPASS GRAFT: Chronic | ICD-10-CM

## 2019-04-30 DIAGNOSIS — Z95.0 PRESENCE OF CARDIAC PACEMAKER: Chronic | ICD-10-CM

## 2019-04-30 DIAGNOSIS — E11.9 TYPE 2 DIABETES MELLITUS WITHOUT COMPLICATIONS: ICD-10-CM

## 2019-04-30 DIAGNOSIS — Z88.0 ALLERGY STATUS TO PENICILLIN: ICD-10-CM

## 2019-04-30 DIAGNOSIS — I11.0 HYPERTENSIVE HEART DISEASE WITH HEART FAILURE: ICD-10-CM

## 2019-04-30 LAB
ALBUMIN SERPL ELPH-MCNC: 4.2 G/DL — SIGNIFICANT CHANGE UP (ref 3.3–5)
ALP SERPL-CCNC: 62 U/L — SIGNIFICANT CHANGE UP (ref 40–120)
ALT FLD-CCNC: 18 U/L — SIGNIFICANT CHANGE UP (ref 10–45)
ANION GAP SERPL CALC-SCNC: 13 MMOL/L — SIGNIFICANT CHANGE UP (ref 5–17)
APTT BLD: 38.1 SEC — HIGH (ref 27.5–36.3)
AST SERPL-CCNC: 25 U/L — SIGNIFICANT CHANGE UP (ref 10–40)
BASOPHILS # BLD AUTO: 0.03 K/UL — SIGNIFICANT CHANGE UP (ref 0–0.2)
BASOPHILS NFR BLD AUTO: 0.4 % — SIGNIFICANT CHANGE UP (ref 0–2)
BILIRUB SERPL-MCNC: 0.7 MG/DL — SIGNIFICANT CHANGE UP (ref 0.2–1.2)
BLD GP AB SCN SERPL QL: NEGATIVE — SIGNIFICANT CHANGE UP
BUN SERPL-MCNC: 24 MG/DL — HIGH (ref 7–23)
CALCIUM SERPL-MCNC: 9.7 MG/DL — SIGNIFICANT CHANGE UP (ref 8.4–10.5)
CHLORIDE SERPL-SCNC: 99 MMOL/L — SIGNIFICANT CHANGE UP (ref 96–108)
CO2 SERPL-SCNC: 25 MMOL/L — SIGNIFICANT CHANGE UP (ref 22–31)
CREAT SERPL-MCNC: 1.16 MG/DL — SIGNIFICANT CHANGE UP (ref 0.5–1.3)
EOSINOPHIL # BLD AUTO: 0.08 K/UL — SIGNIFICANT CHANGE UP (ref 0–0.5)
EOSINOPHIL NFR BLD AUTO: 1.1 % — SIGNIFICANT CHANGE UP (ref 0–6)
GLUCOSE SERPL-MCNC: 171 MG/DL — HIGH (ref 70–99)
HCT VFR BLD CALC: 41.1 % — SIGNIFICANT CHANGE UP (ref 39–50)
HGB BLD-MCNC: 13.1 G/DL — SIGNIFICANT CHANGE UP (ref 13–17)
IMM GRANULOCYTES NFR BLD AUTO: 0.3 % — SIGNIFICANT CHANGE UP (ref 0–1.5)
INR BLD: 1.75 — HIGH (ref 0.88–1.16)
LYMPHOCYTES # BLD AUTO: 0.88 K/UL — LOW (ref 1–3.3)
LYMPHOCYTES # BLD AUTO: 11.9 % — LOW (ref 13–44)
MCHC RBC-ENTMCNC: 30.3 PG — SIGNIFICANT CHANGE UP (ref 27–34)
MCHC RBC-ENTMCNC: 31.9 GM/DL — LOW (ref 32–36)
MCV RBC AUTO: 94.9 FL — SIGNIFICANT CHANGE UP (ref 80–100)
MONOCYTES # BLD AUTO: 0.55 K/UL — SIGNIFICANT CHANGE UP (ref 0–0.9)
MONOCYTES NFR BLD AUTO: 7.5 % — SIGNIFICANT CHANGE UP (ref 2–14)
NEUTROPHILS # BLD AUTO: 5.81 K/UL — SIGNIFICANT CHANGE UP (ref 1.8–7.4)
NEUTROPHILS NFR BLD AUTO: 78.8 % — HIGH (ref 43–77)
NRBC # BLD: 0 /100 WBCS — SIGNIFICANT CHANGE UP (ref 0–0)
PLATELET # BLD AUTO: 182 K/UL — SIGNIFICANT CHANGE UP (ref 150–400)
POTASSIUM SERPL-MCNC: 4.4 MMOL/L — SIGNIFICANT CHANGE UP (ref 3.5–5.3)
POTASSIUM SERPL-SCNC: 4.4 MMOL/L — SIGNIFICANT CHANGE UP (ref 3.5–5.3)
PROT SERPL-MCNC: 7.6 G/DL — SIGNIFICANT CHANGE UP (ref 6–8.3)
PROTHROM AB SERPL-ACNC: 20.1 SEC — HIGH (ref 10–12.9)
RBC # BLD: 4.33 M/UL — SIGNIFICANT CHANGE UP (ref 4.2–5.8)
RBC # FLD: 13 % — SIGNIFICANT CHANGE UP (ref 10.3–14.5)
RH IG SCN BLD-IMP: POSITIVE — SIGNIFICANT CHANGE UP
SODIUM SERPL-SCNC: 137 MMOL/L — SIGNIFICANT CHANGE UP (ref 135–145)
WBC # BLD: 7.37 K/UL — SIGNIFICANT CHANGE UP (ref 3.8–10.5)
WBC # FLD AUTO: 7.37 K/UL — SIGNIFICANT CHANGE UP (ref 3.8–10.5)

## 2019-04-30 PROCEDURE — 99284 EMERGENCY DEPT VISIT MOD MDM: CPT | Mod: 25

## 2019-04-30 PROCEDURE — 93005 ELECTROCARDIOGRAM TRACING: CPT

## 2019-04-30 PROCEDURE — 86850 RBC ANTIBODY SCREEN: CPT

## 2019-04-30 PROCEDURE — 93010 ELECTROCARDIOGRAM REPORT: CPT

## 2019-04-30 PROCEDURE — 85610 PROTHROMBIN TIME: CPT

## 2019-04-30 PROCEDURE — 86900 BLOOD TYPING SEROLOGIC ABO: CPT

## 2019-04-30 PROCEDURE — 80053 COMPREHEN METABOLIC PANEL: CPT

## 2019-04-30 PROCEDURE — 85025 COMPLETE CBC W/AUTO DIFF WBC: CPT

## 2019-04-30 PROCEDURE — 85730 THROMBOPLASTIN TIME PARTIAL: CPT

## 2019-04-30 PROCEDURE — 86901 BLOOD TYPING SEROLOGIC RH(D): CPT

## 2019-04-30 PROCEDURE — 36415 COLL VENOUS BLD VENIPUNCTURE: CPT

## 2019-04-30 PROCEDURE — 99283 EMERGENCY DEPT VISIT LOW MDM: CPT | Mod: 25

## 2019-04-30 RX ORDER — ATORVASTATIN CALCIUM 80 MG/1
1 TABLET, FILM COATED ORAL
Qty: 0 | Refills: 0 | COMMUNITY

## 2019-04-30 RX ORDER — FUROSEMIDE 40 MG
40 TABLET ORAL
Qty: 0 | Refills: 0 | COMMUNITY

## 2019-04-30 RX ORDER — LOSARTAN POTASSIUM 100 MG/1
1 TABLET, FILM COATED ORAL
Qty: 0 | Refills: 0 | COMMUNITY

## 2019-04-30 RX ORDER — DIGOXIN 250 MCG
1 TABLET ORAL
Qty: 0 | Refills: 0 | COMMUNITY

## 2019-04-30 RX ORDER — DOCUSATE SODIUM 100 MG
1 CAPSULE ORAL
Qty: 14 | Refills: 0 | OUTPATIENT
Start: 2019-04-30 | End: 2019-05-13

## 2019-04-30 RX ORDER — METFORMIN HYDROCHLORIDE 850 MG/1
1 TABLET ORAL
Qty: 0 | Refills: 0 | COMMUNITY

## 2019-04-30 RX ORDER — POTASSIUM CHLORIDE 20 MEQ
10 PACKET (EA) ORAL
Qty: 0 | Refills: 0 | COMMUNITY

## 2019-04-30 RX ORDER — APIXABAN 2.5 MG/1
1 TABLET, FILM COATED ORAL
Qty: 0 | Refills: 0 | COMMUNITY

## 2019-04-30 NOTE — ED PROVIDER NOTE - CARE PROVIDERS DIRECT ADDRESSES
[email or web address]

## 2019-04-30 NOTE — ED PROVIDER NOTE - CARE PROVIDER_API CALL
Jamey Cheema)  Gastroenterology; Internal Medicine  [address]  [place] [address]  Phone: [phone]  Fax: [phone]  Follow Up Time:     Jean Martinez)  Medicine  [address], [facility] [address]  [place] [address]  Phone: [phone]  Fax: [phone]  Follow Up Time:

## 2019-04-30 NOTE — ED PROVIDER NOTE - CLINICAL SUMMARY MEDICAL DECISION MAKING FREE TEXT BOX
Patient with rectal bleeding upon BM. Well appearing, NAD and VSS. Hemodynamically stable with no ekg changes.

## 2019-04-30 NOTE — ED PROVIDER NOTE - CHPI ED SYMPTOMS NEG
no chills/no dysuria/no burning urination/no abdominal distension/no hematuria/no nausea/no diarrhea/no fever/no vomiting

## 2019-04-30 NOTE — ED ADULT NURSE NOTE - OBJECTIVE STATEMENT
83y M, hx of dementia and cognitive deficit  secondary to previous CVA, presents to ed with daughter for episode of bright red blood per rectum and in toilet per daughter. Per daughter, "he hasn't complained of pain or shortness of breath" Pt resting comfortably. No noted sob nor tachypnea. IV access obtained, ekg performed. daughter at bedside. noted bleeding hemorrhoids to rectum. Will continue to monitor.

## 2019-04-30 NOTE — ED ADULT NURSE NOTE - CHPI ED NUR SYMPTOMS NEG
no diarrhea/no nausea/no abdominal distension/no dysuria/no hematuria/no fever/no burning urination/no chills/no vomiting

## 2019-04-30 NOTE — ED ADULT NURSE NOTE - NSIMPLEMENTINTERV_GEN_ALL_ED
Implemented All Fall with Harm Risk Interventions:  [place] to call system. Call bell, personal items and telephone within reach. Instruct patient to call for assistance. Room bathroom lighting operational. Non-slip footwear when patient is off stretcher. Physically safe environment: no spills, clutter or unnecessary equipment. Stretcher in lowest position, wheels locked, appropriate side rails in place. Provide visual cue, wrist band, yellow gown, etc. Monitor gait and stability. Monitor for mental status changes and reorient to person, place, and time. Review medications for side effects contributing to fall risk. Reinforce activity limits and safety measures with patient and family. Provide visual clues: red socks.

## 2019-04-30 NOTE — ED ADULT TRIAGE NOTE - CHIEF COMPLAINT QUOTE
as per daughter there was a lot of blood everywhere in the bathroom this morning after her father has a bowel movement.

## 2019-04-30 NOTE — ED PROVIDER NOTE - ATTENDING CONTRIBUTION TO CARE
Elderly M on Eliquis presenting with rectal bleeding x 1 days.  No other symptoms.  Denies ab pain, weakness, chest pain, palpitations.  Pt looks well, nad, ambulatory.  Abd soft nontender, nl bowel sounds, no r/g.  Pt with nl brown stool, no hemorrhoids, masses or fissures.  No bleeding noted.  Given above labs checked and noted.  Hb stable, VSS.  Pt desires discharge - will fu with GI as outpt.  Bleeding precautions given.

## 2019-04-30 NOTE — ED PROVIDER NOTE - RESPIRATORY NEGATIVE STATEMENT, MLM
08/14/17 0855   Events/Summary/Plan   Events/Summary/Plan Pt is on RA stable using O2 at night to keep sats above 90% pt nguyen well will monitor.   Non-Invasive Resp Device Site Inspection Completed Intact   Location NC b/l ears   Interdisciplinary Plan of Care-Goals (Indications)   Obstructive Ventilatory Defect or Pulmonary Disease without Obvious Obstruction Strong Subjective / Objective Improvement   Education   Education Yes - Pt. / Family has been Instructed in use of Respiratory Equipment   Respiratory WDL   Respiratory (WDL) X   Chest Exam   Work Of Breathing / Effort Mild   Respiration 17   Pulse (!) 57   Breath Sounds   RUL Breath Sounds Clear   RML Breath Sounds Clear   RLL Breath Sounds Clear;Diminished   ARGELIA Breath Sounds Clear   LLL Breath Sounds Clear;Diminished   Secretions   Cough Non Productive   Oximetry   #Pulse Oximetry (Single Determination) Yes   Oxygen   Home O2 Use Prior To Admission? No   Pulse Oximetry 96 %   O2 (LPM) 0   O2 (FiO2) 21   O2 Daily Delivery Respiratory  Room Air with O2 Available      no chest pain,  and no shortness of breath.

## 2019-04-30 NOTE — ED PROVIDER NOTE - OBJECTIVE STATEMENT
84 y/o m with h/o dementia ,CHF, HTN, cardiac disease , A- fib with eliquis, CAD, CVA, DM was brought in by daughter due to concern of rectal bleeding. As per daughter dad had a BM and she came into the bathroom and found blood splashed around bathroom near toilet. Denies fever, n, v, d, abd pain, sob, chest pain, rectal pain, dysuria. No prior GI bleed or rectal bleeding.

## 2019-08-09 ENCOUNTER — APPOINTMENT (OUTPATIENT)
Dept: HEART AND VASCULAR | Facility: CLINIC | Age: 84
End: 2019-08-09

## 2019-08-16 ENCOUNTER — APPOINTMENT (OUTPATIENT)
Dept: INTERNAL MEDICINE | Facility: CLINIC | Age: 84
End: 2019-08-16
Payer: MEDICARE

## 2019-08-16 VITALS
TEMPERATURE: 97 F | WEIGHT: 180 LBS | RESPIRATION RATE: 14 BRPM | DIASTOLIC BLOOD PRESSURE: 70 MMHG | HEART RATE: 70 BPM | OXYGEN SATURATION: 96 % | SYSTOLIC BLOOD PRESSURE: 110 MMHG | HEIGHT: 68 IN | BODY MASS INDEX: 27.28 KG/M2

## 2019-08-16 DIAGNOSIS — I10 ESSENTIAL (PRIMARY) HYPERTENSION: ICD-10-CM

## 2019-08-16 DIAGNOSIS — E11.9 TYPE 2 DIABETES MELLITUS W/OUT COMPLICATIONS: ICD-10-CM

## 2019-08-16 DIAGNOSIS — I25.10 ATHEROSCLEROTIC HEART DISEASE OF NATIVE CORONARY ARTERY W/OUT ANGINA PECTORIS: ICD-10-CM

## 2019-08-16 DIAGNOSIS — Z00.00 ENCOUNTER FOR GENERAL ADULT MEDICAL EXAMINATION W/OUT ABNORMAL FINDINGS: ICD-10-CM

## 2019-08-16 PROCEDURE — 99397 PER PM REEVAL EST PAT 65+ YR: CPT

## 2019-08-16 PROCEDURE — 93000 ELECTROCARDIOGRAM COMPLETE: CPT

## 2019-08-16 PROCEDURE — 36415 COLL VENOUS BLD VENIPUNCTURE: CPT

## 2019-08-16 NOTE — HISTORY OF PRESENT ILLNESS
[de-identified] : Here today with daughter\rosanne Has been seeing Dr Sheffield for cardiology. Not following with EP, bot ICD checked there.\rosanne Started on plavix for a mild MI that occurred last year.   - Had dyspnea and CP.  \rosanne Has some help at home. Had a minor fall.   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  \par Good appetite.    Walking is slow.   [FreeTextEntry1] : wellness

## 2019-08-16 NOTE — PLAN
[FreeTextEntry1] : Wellness complete\par labs today\par  regular f/up with cardiology\par   ICD - Cardiology checking in on this\par Discussed medication for memory - will hold off for now and continue to monitor\par ROutine ambulation

## 2019-08-18 LAB
ALBUMIN SERPL ELPH-MCNC: 5 G/DL
ALP BLD-CCNC: 63 U/L
ALT SERPL-CCNC: 14 U/L
ANION GAP SERPL CALC-SCNC: 15 MMOL/L
AST SERPL-CCNC: 18 U/L
BASOPHILS # BLD AUTO: 0.05 K/UL
BASOPHILS NFR BLD AUTO: 0.6 %
BILIRUB SERPL-MCNC: 0.8 MG/DL
BUN SERPL-MCNC: 24 MG/DL
CALCIUM SERPL-MCNC: 10.7 MG/DL
CHLORIDE SERPL-SCNC: 99 MMOL/L
CHOLEST SERPL-MCNC: 235 MG/DL
CHOLEST/HDLC SERPL: 6 RATIO
CO2 SERPL-SCNC: 26 MMOL/L
CREAT SERPL-MCNC: 1.14 MG/DL
EOSINOPHIL # BLD AUTO: 0.11 K/UL
EOSINOPHIL NFR BLD AUTO: 1.2 %
ESTIMATED AVERAGE GLUCOSE: 166 MG/DL
GLUCOSE SERPL-MCNC: 131 MG/DL
HBA1C MFR BLD HPLC: 7.4 %
HCT VFR BLD CALC: 49.1 %
HDLC SERPL-MCNC: 39 MG/DL
HGB BLD-MCNC: 15.2 G/DL
IMM GRANULOCYTES NFR BLD AUTO: 0.3 %
LDLC SERPL CALC-MCNC: 157 MG/DL
LYMPHOCYTES # BLD AUTO: 1.28 K/UL
LYMPHOCYTES NFR BLD AUTO: 14.5 %
MAN DIFF?: NORMAL
MCHC RBC-ENTMCNC: 29.8 PG
MCHC RBC-ENTMCNC: 31 GM/DL
MCV RBC AUTO: 96.3 FL
MONOCYTES # BLD AUTO: 0.69 K/UL
MONOCYTES NFR BLD AUTO: 7.8 %
NEUTROPHILS # BLD AUTO: 6.65 K/UL
NEUTROPHILS NFR BLD AUTO: 75.6 %
NT-PROBNP SERPL-MCNC: 1065 PG/ML
PLATELET # BLD AUTO: 212 K/UL
POTASSIUM SERPL-SCNC: 5 MMOL/L
PROT SERPL-MCNC: 7.8 G/DL
RBC # BLD: 5.1 M/UL
RBC # FLD: 13.6 %
SODIUM SERPL-SCNC: 140 MMOL/L
TRIGL SERPL-MCNC: 194 MG/DL
VIT B12 SERPL-MCNC: 359 PG/ML
WBC # FLD AUTO: 8.81 K/UL

## 2019-09-30 ENCOUNTER — APPOINTMENT (OUTPATIENT)
Dept: VASCULAR SURGERY | Facility: CLINIC | Age: 84
End: 2019-09-30
Payer: MEDICARE

## 2019-09-30 PROCEDURE — 99203 OFFICE O/P NEW LOW 30 MIN: CPT

## 2019-09-30 NOTE — PHYSICAL EXAM
[Respiratory Effort] : normal respiratory effort [2+] : right 2+ [1+] : right 1+ [Right Carotid Bruit] : no bruit heard over the right carotid [JVD] : no jugular venous distention  [Left Carotid Bruit] : no bruit heard over the left carotid [Ankle Swelling (On Exam)] : not present [de-identified] : Well appearing, NAD [Varicose Veins Of Lower Extremities] : not present [] : not present [de-identified] : FROM [de-identified] : NCAT [de-identified] : Erythema on the medial shin b/l

## 2019-09-30 NOTE — ASSESSMENT
[FreeTextEntry1] : 84 y/o M with a hx of HLD, HTN, DM, CAD, CHF, A-fib, and stoke, presents today with bleeding from varicose veins. BLE venous doppler demonstrates no reflux.\par Pt with vein bleeding in the medial shin with inflammation and pus. Pt overall is feeling well, denying pain, ulceration and fever/chills. Advised pt to apply Neosporin at the site of bleeding and keep the area moist with moisturizing cream. Additionally advise the pt to avoid physical trauma to the veins to avoid bleeding.\par Follow up as needed

## 2019-09-30 NOTE — ADDENDUM
[FreeTextEntry1] : I, Luan Smallwood, acted solely as a scribe for Dr. Bear Goldman on this date. 09/30/2019

## 2019-09-30 NOTE — HISTORY OF PRESENT ILLNESS
[FreeTextEntry1] : 82 y/o M with a hx of HLD, HTN, DM, CAD, CHF, A-fib, and stoke, presents today for an initial evaluation of bleeding from varicose veins. Pt with varicose veins started to bleed in the lateral shin was inflamed with pus last week and recently stopped bleeding. Pt denies pain, swelling, ulceration in the area of bleeding and fever/chills.\par Pt presents with his family member that states the pt recently had a heart attack with stents placed in the past.\par Pt is currently on Eliquis and Plavix.

## 2019-09-30 NOTE — END OF VISIT
[FreeTextEntry3] : All medical record entries made by the Scribe were at my, Dr. Bear Goldman, direction and personally dictated by me on 09/30/2019 I have reviewed the chart and agree that the record accurately reflects my personal performance of the history, physical exam, assessment and plan. I have also personally directed, reviewed and agreed with the chart.

## 2021-01-01 NOTE — ED ADULT NURSE NOTE - PMH
CAD (coronary artery disease)    CHF (congestive heart failure)    CVA (cerebral vascular accident)    DM (diabetes mellitus) Breasts of normal contour, size, color and symmetry, without milk, signs of inflammation or tenderness; nipples with normal size, shape, number and spacing.  Axillary exam normal.

## 2021-09-14 NOTE — ED ADULT NURSE NOTE - NSSISCREENINGQ5_ED_A_ED
Modify Regimen: Fluocinonide qd Otc Regimen: Dandruff shampoo (let sit for 10-20 minutes). Swap between head and shoulders, selsun blue, neutragena tar shampoo, and nioxin Detail Level: Zone No

## 2022-10-21 NOTE — H&P ADULT - ASSESSMENT
157.48
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

## 2022-11-04 NOTE — ED PROVIDER NOTE - ALLERGIC/IMMUNOLOGIC NEGATIVE STATEMENT, MLM
Your symptoms are likely due to a Viral Illness, your test was + for entero/rhinovirus.    You also have an elevated blood level that indicates you may have heart failure.     Please follow-up with your primary care doctor this week.    Please follow-up with a cardiologist. The hospital should call you to help set up an appointment with the doctor. If you do not hear from someone by tomorrow, please call the number provided to schedule an appointment.      Shortness of breath    Shortness of breath (dyspnea) means you have trouble breathing and could indicate a medical problem. Causes include lung disease, heart disease, low amount of red blood cells (anemia), poor physical fitness, being overweight, smoking, etc. Your health care provider today may not be able to find a cause for your shortness of breath after your exam. In this case, it is important to have a follow-up exam with your primary care physician as instructed. If medicines were prescribed, take them as directed for the full length of time directed. Refrain from tobacco products.    SEEK IMMEDIATE MEDICAL CARE IF YOU HAVE ANY OF THE FOLLOWING SYMPTOMS: worsening shortness of breath, chest pain, back pain, abdominal pain, fever, coughing up blood, lightheadedness/dizziness.    Rest, drink plenty of fluids.  Advance activity as tolerated.  Continue all previously prescribed medications as directed.  Follow up with your primary care physician as needed, bring copies of your results.  Return to the ER for worsening or persistent symptoms, and/or ANY NEW OR CONCERNING SYMPTOMS. If you have issues obtaining follow up, please call: [phone] to obtain a doctor or specialist who takes your insurance in your area. no immunosuppressive disorder, and no pruritus.

## 2024-06-05 NOTE — ED PROVIDER NOTE - CONDITION AT DISCHARGE:
Problem: Adult Inpatient Plan of Care  Goal: Plan of Care Review  Outcome: Progressing  Goal: Patient-Specific Goal (Individualized)  Outcome: Progressing  Goal: Absence of Hospital-Acquired Illness or Injury  Outcome: Progressing  Goal: Optimal Comfort and Wellbeing  Outcome: Progressing  Goal: Readiness for Transition of Care  Outcome: Progressing     Problem: Bariatric Environmental Safety  Goal: Safety Maintained with Care  Outcome: Progressing     Problem: Diabetes Comorbidity  Goal: Blood Glucose Level Within Targeted Range  Outcome: Progressing     Problem: Wound  Goal: Optimal Coping  Outcome: Progressing  Goal: Optimal Functional Ability  Outcome: Progressing  Goal: Absence of Infection Signs and Symptoms  Outcome: Progressing  Goal: Improved Oral Intake  Outcome: Progressing  Goal: Optimal Pain Control and Function  Outcome: Progressing  Goal: Skin Health and Integrity  Outcome: Progressing  Goal: Optimal Wound Healing  Outcome: Progressing      Satisfactory

## 2025-03-26 NOTE — ED ADULT TRIAGE NOTE - WEIGHT IN LBS
182.9 pt bibems for c/o SI. pt endorses that he is hearing voices telling him to harm himself. pt presents with abrasion to right abdomen. endorses he was breaking something open and cut himself. denies alcohol/drug use. 1:1 initiated in triage, pt calm and cooperative at this time.